# Patient Record
Sex: MALE
[De-identification: names, ages, dates, MRNs, and addresses within clinical notes are randomized per-mention and may not be internally consistent; named-entity substitution may affect disease eponyms.]

---

## 2017-11-28 ENCOUNTER — HOSPITAL ENCOUNTER (INPATIENT)
Dept: HOSPITAL 42 - ED | Age: 67
LOS: 3 days | Discharge: HOME | DRG: 841 | End: 2017-12-01
Attending: INTERNAL MEDICINE | Admitting: INTERNAL MEDICINE
Payer: COMMERCIAL

## 2017-11-28 VITALS — BODY MASS INDEX: 30.5 KG/M2

## 2017-11-28 DIAGNOSIS — C85.10: Primary | ICD-10-CM

## 2017-11-28 DIAGNOSIS — D69.6: ICD-10-CM

## 2017-11-28 DIAGNOSIS — D59.9: ICD-10-CM

## 2017-11-28 DIAGNOSIS — D72.819: ICD-10-CM

## 2017-11-28 DIAGNOSIS — I10: ICD-10-CM

## 2017-11-28 LAB
ALBUMIN/GLOB SERPL: 1.2 {RATIO} (ref 1.1–1.8)
ALP SERPL-CCNC: 56 U/L (ref 38–126)
ALT SERPL-CCNC: 33 U/L (ref 7–56)
ANISOCYTOSIS BLD QL SMEAR: (no result)
APPEARANCE UR: CLEAR
APTT BLD: 27.2 SECONDS (ref 25.1–36.5)
AST SERPL-CCNC: 42 U/L (ref 17–59)
BACTERIA #/AREA URNS HPF: (no result) /[HPF]
BASOPHILS # BLD AUTO: 0.07 K/MM3 (ref 0–2)
BASOPHILS NFR BLD: 2.9 % (ref 0–3)
BILIRUB DIRECT SERPL-MCNC: 0.8 MG/DL (ref 0–0.4)
BILIRUB SERPL-MCNC: 3.8 MG/DL (ref 0.2–1.3)
BILIRUB UR-MCNC: NEGATIVE MG/DL
BUN SERPL-MCNC: 19 MG/DL (ref 7–21)
CALCIUM SERPL-MCNC: 9.2 MG/DL (ref 8.4–10.5)
CHLORIDE SERPL-SCNC: 105 MMOL/L (ref 98–107)
CO2 SERPL-SCNC: 23 MMOL/L (ref 21–33)
COLOR UR: YELLOW
EOSINOPHIL # BLD: 0 10*3/UL (ref 0–0.7)
EOSINOPHIL NFR BLD: 1.3 % (ref 1.5–5)
EOSINOPHIL NFR BLD: 4 % (ref 0–3)
EPI CELLS #/AREA URNS HPF: (no result) /HPF (ref 0–5)
ERYTHROCYTE [DISTWIDTH] IN BLOOD BY AUTOMATED COUNT: 21.4 % (ref 11.5–14.5)
ERYTHROCYTE [DISTWIDTH] IN BLOOD BY AUTOMATED COUNT: 21.6 % (ref 11.5–14.5)
GLOBULIN SER-MCNC: 3.8 GM/DL
GLUCOSE SERPL-MCNC: 140 MG/DL (ref 70–110)
GLUCOSE UR STRIP-MCNC: NEGATIVE MG/DL
GRANULOCYTES # BLD: 1.08 10*3/UL (ref 1.4–6.5)
GRANULOCYTES NFR BLD: 45.2 % (ref 50–68)
HCT VFR BLD CALC: 16.6 % (ref 42–52)
HCT VFR BLD CALC: 18.4 % (ref 42–52)
HYPOCHROMIA BLD QL SMEAR: SLIGHT
INR PPP: 1.27 (ref 0.93–1.08)
IRON SERPL-MCNC: 211 UG/DL (ref 45–180)
KETONES UR STRIP-MCNC: NEGATIVE MG/DL
LEUKOCYTE ESTERASE UR-ACNC: NEGATIVE LEU/UL
LG PLATELETS BLD QL SMEAR: PRESENT
LIPASE SERPL-CCNC: 57 U/L (ref 23–300)
LYMPHOCYTES # BLD: 0.5 10*3/UL (ref 1.2–3.4)
LYMPHOCYTES NFR BLD AUTO: 22.6 % (ref 22–35)
MACROCYTES BLD QL SMEAR: (no result)
MAGNESIUM SERPL-MCNC: 2 MG/DL (ref 1.7–2.2)
MCH RBC QN AUTO: 35.5 PG (ref 25–35)
MCH RBC QN AUTO: 35.6 PG (ref 25–35)
MCHC RBC AUTO-ENTMCNC: 31.9 G/DL (ref 31–37)
MCHC RBC AUTO-ENTMCNC: 32.1 G/DL (ref 31–37)
MCV RBC AUTO: 110.8 FL (ref 80–105)
MCV RBC AUTO: 111.4 FL (ref 80–105)
METAMYELOCYTES NFR BLD: 3 %
MICROCYTES BLD QL SMEAR: (no result)
MONOCYTES # BLD AUTO: 0.7 10*3/UL (ref 0.1–0.6)
MONOCYTES NFR BLD: 28 % (ref 1–6)
NEUTROPHILS NFR BLD AUTO: 47 % (ref 50–70)
NEUTS BAND NFR BLD: 9 % (ref 0–2)
PH UR STRIP: 6 [PH] (ref 4.7–8)
PLATELET # BLD EST: NORMAL 10*3/UL
PLATELET # BLD: 140 10^3/UL (ref 120–450)
PLATELET # BLD: 150 10^3/UL (ref 120–450)
PMV BLD AUTO: 9.5 FL (ref 7–11)
PMV BLD AUTO: 9.6 FL (ref 7–11)
POIKILOCYTOSIS BLD QL SMEAR: SLIGHT
POLYCHROMASIA BLD QL SMEAR: (no result)
POTASSIUM SERPL-SCNC: 3.7 MMOL/L (ref 3.6–5)
PROT SERPL-MCNC: 8.5 G/DL (ref 5.8–8.3)
PROT UR STRIP-MCNC: NEGATIVE MG/DL
RBC # UR STRIP: (no result) /UL
RETICS/RBC NFR: 24.31 % (ref 0.5–1.5)
SODIUM SERPL-SCNC: 140 MMOL/L (ref 132–148)
SP GR UR STRIP: 1.01 (ref 1–1.03)
TROPONIN I SERPL-MCNC: < 0.01 NG/ML
UROBILINOGEN UR STRIP-ACNC: 1 E.U./DL
WBC # BLD AUTO: 2.3 10^3/UL (ref 4.5–11)
WBC # BLD AUTO: 2.4 10^3/UL (ref 4.5–11)
WBC #/AREA URNS HPF: (no result) /HPF (ref 0–6)

## 2017-11-28 RX ADMIN — PANTOPRAZOLE SODIUM SCH MG: 40 TABLET, DELAYED RELEASE ORAL at 18:46

## 2017-11-28 NOTE — CARD
--------------- APPROVED REPORT --------------





EKG Measurement

Heart [id number]

OR [id number]

[id number]

[id number]

[id number]



<Conclusion>

Normal sinus rhythm

Possible Inferior infarct, age undetermined

Abnormal ECG

## 2017-11-28 NOTE — RAD
HISTORY:

sob; MONCADA  



COMPARISON:

No prior.



TECHNIQUE:

Chest PA and lateral



FINDINGS:



LUNGS:

No active pulmonary disease.



PLEURA:

No significant pleural effusion identified. No pneumothorax apparent.



CARDIOVASCULAR:

Normal.



OSSEOUS STRUCTURES:

No significant abnormalities.



VISUALIZED UPPER ABDOMEN:

Normal.



OTHER FINDINGS:

None.



IMPRESSION:

No active disease.

## 2017-11-28 NOTE — ED PDOC
Arrival/HPI





- General


Chief Complaint: Abnormal Labs


Time Seen by Provider: 11/28/17 13:42


Historian: Patient, Family (son)





- History of Present Illness


Narrative History of Present Illness (Text): 


11/28/17 14:02








A 67 year old male, whose past medical history includes hypertension, presents 

to the emergency department after being sent in by Dr. Medina for "low blood 

count." The patient reports for the last few months he has been feeling weak 

and short of breath on exertion. He states for the last 4 days he has been 

feeling worse and nearly passed out 4 days ago.  He stated he felt light headed 

with hot flashes. Today the patient had the same symptoms. He notes when he is 

relaxed and sitting down he feels perfectly fine. The patient currently 

complains of shortness of breath of exertion and mild nausea; he denies any 

headaches, chest pain, abdominal pain, shortness of breath while resting, 

vomiting, diarrhea, hematuria, dysuria, or any other complaints at this time. 





PMD: Dr. Medina


Time/Duration: < week (worsening over the last 4 days), > month


Symptom Course: Worsening


Activities at Onset: Other (exertion )


Context: Walking, Exertion, Home





Past Medical History





- Provider Review


Nursing Documentation Reviewed: Yes





- Infectious Disease


Hx of Infectious Diseases: None





- Cardiac


Hx Hypertension: Yes





- Psychiatric


Hx Substance Use: No





- Anesthesia


Hx Anesthesia: No





Family/Social History





- Physician Review


Nursing Documentation Reviewed: Yes


Family/Social History: No Known Family HX


Smoking Status: Never Smoked


Hx Alcohol Use: No


Hx Substance Use: No





Allergies/Home Meds


Allergies/Adverse Reactions: 


Allergies





No Known Allergies Allergy (Verified 11/28/17 13:50)


 








Home Medications: 


 Home Meds











 Medication  Instructions  Recorded  Confirmed


 


Ergocalciferol [Drisdol 50,000 1 cap PO Q7D 11/28/17 11/28/17





Intl Units Cap]   


 


diltiaZEM CD [Cardizem CD] 1 tab PO DAILY 11/28/17 11/28/17














Review of Systems





- Physician Review


All systems were reviewed & negative as marked: Yes





- Review of Systems


Constitutional: Fatigue.  absent: Fevers


Respiratory: SOB


Cardiovascular: MONCADA, Other (near syncope).  absent: Chest Pain


Gastrointestinal: Nausea (mild).  absent: Abdominal Pain, Diarrhea, Vomiting


Genitourinary Male: absent: Dysuria, Hematuria


Neurological: Dizziness





Physical Exam


Vital Signs Reviewed: Yes


Vital Signs











  Temp Pulse Resp BP Pulse Ox


 


 11/28/17 15:03   86  18  132/69  99


 


 11/28/17 13:56  98.7 F  92 H  18  134/71  99











Temperature: Afebrile


Blood Pressure: Normal


Pulse: Regular


Respiratory Rate: Normal


Appearance: Positive for: Well-Appearing, Non-Toxic, Comfortable


Pain Distress: None


Mental Status: Positive for: Alert and Oriented X 3





- Systems Exam


Head: Present: Atraumatic, Normocephalic


Pupils: Present: PERRL


Conjunctiva: Present: Other (conjunctival pallor)


Mouth: Present: Moist Mucous Membranes, Other (oral mucosal pallor)


Pharnyx: Present: Normal.  No: ERYTHEMA, EXUDATE


Neck: Present: Normal Range of Motion


Respiratory/Chest: Present: Clear to Auscultation, Good Air Exchange.  No: 

Respiratory Distress, Accessory Muscle Use


Cardiovascular: Present: Regular Rate and Rhythm, Normal S1, S2.  No: Murmurs


Abdomen: Present: Normal Bowel Sounds.  No: Tenderness, Distention, Peritoneal 

Signs


Back: Present: Normal Inspection


Upper Extremity: Present: Normal Inspection.  No: Cyanosis, Edema


Lower Extremity: Present: Normal Inspection.  No: Edema


Neurological: Present: GCS=15, CN II-XII Intact, Speech Normal


Skin: Present: Warm, Dry, Normal Color.  No: Rashes


Psychiatric: Present: Alert, Oriented x 3, Normal Insight, Normal Concentration





Medical Decision Making


ED Course and Treatment: 


11/28/17 14:10


Impression: A 67 year old male with generalized weakness





Plan:


-- EKG


-- Chest X-ray


-- Labs


-- Urinalysis 


-- Reassess and disposition





Progress Notes:


 





11/28/17 15:13





Patient with Hgb of 5.9 with low WBC and normal platelet count.  Other labs are 

suggestive of hemolysis.  Patient consented for blood transfusion; consent 

signed.  Dr. Medina had already discussed the case with Dr. Johnson for consult.  

Will start transfusion and admit for further workup and treatment.  Case 

discussed with Dr. Willams for admission to his service.








- Lab Interpretations


Lab Results: 








 11/28/17 14:06 





 11/28/17 14:06 





 Lab Results





11/28/17 14:06: Blood Type Pending, Antibody Screen Pending, BBK History 

Checked No verified bt


11/28/17 14:06: Iron 211 H, TIBC 293.4, % Saturation 72 H


11/28/17 14:06: PT 13.9 H, INR 1.27 H, APTT 27.2


11/28/17 14:06: Sodium 140, Potassium 3.7, Chloride 105, Carbon Dioxide 23, 

Anion Gap 16, BUN 19, Creatinine 1.3, Est GFR (African Amer) > 60, Est GFR (Non-

Af Amer) 55, Random Glucose 140 H, Calcium 9.2, Magnesium 2.0, Total Bilirubin 

3.8 H, Direct Bilirubin 0.8 H, AST 42, ALT 33, Alkaline Phosphatase 56, Lactate 

Dehydrogenase 1553 H, Total Creatine Kinase 59, Troponin I < 0.01, NT-Pro-B 

Natriuret Pep 452 H, Total Protein 8.5 H, Albumin 4.6, Globulin 3.8, Albumin/

Globulin Ratio 1.2, Lipase 57, Vitamin B12 Pending, Folate Pending


11/28/17 14:06: WBC 2.4 L*, RBC 1.66 L, Hgb 5.9 L*, Hct 18.4 L*, .8 H, 

MCH 35.5 H, MCHC 32.1, RDW 21.4 H, Plt Count 150, MPV 9.6, Gran % 45.2 L, Lymph 

% (Auto) 22.6, Mono % (Auto) 28.0 H, Eos % (Auto) 1.3 L, Baso % (Auto) 2.9, 

Gran # 1.08 L, Lymph # 0.5 L, Mono # 0.7 H, Eos # 0.0, Baso # 0.07, Neutrophils 

% (Manual) Pending, Lymphocytes % (Manual) Pending, Monocytes % (Manual) Pending

, Retic Count 24.31 H*











- RAD Interpretation


Narrative RAD Interpretations (Text): 





11/28/17 15:18


no active disease.


Radiology Orders: 








11/28/17 13:58


CHEST TWO VIEWS (PA/LAT) [RAD] Stat 











: Radiologist





- EKG Interpretation


EKG Interpretation (Text): 





11/28/17 15:19


NSR @ 91; normal intervals; normal axis; less than 1/2 mm ST dep V4-V6.


Interpreted by ED Physician: Yes


Type: 12 lead EKG


Comparison: No previous EKG avail.





- Scribe Statement


The provider has reviewed the documentation as recorded by the Scribe


Jennifer Tyler





Provider Scribe Attestation:


All medical record entries made by the Scribe were at my direction and 

personally dictated by me. I have reviewed the chart and agree that the record 

accurately reflects my personal performance of the history, physical exam, 

medical decision making, and the department course for this patient. I have 

also personally directed, reviewed, and agree with the discharge instructions 

and disposition.








Disposition/Present on Arrival





- Present on Arrival


Any Indicators Present on Arrival: No


History of DVT/PE: No


History of Uncontrolled Diabetes: No


Urinary Catheter: No


History of Decub. Ulcer: No


History Surgical Site Infection Following: None





- Disposition


Have Diagnosis and Disposition been Completed?: Yes


Diagnosis: 


 Hemolytic anemia





Disposition: HOSPITALIZED


Disposition Time: 15:00


Patient Plan: Admission


Condition: FAIR


Referrals: 


Silas Medina MD [Primary Care Provider] - Follow up with primary


Forms:  REMOTV (English)

## 2017-11-29 LAB
ALBUMIN/GLOB SERPL: 1.3 {RATIO} (ref 1.1–1.8)
ALP SERPL-CCNC: 57 U/L (ref 38–126)
ALT SERPL-CCNC: 26 U/L (ref 7–56)
AST SERPL-CCNC: 37 U/L (ref 17–59)
BILIRUB SERPL-MCNC: 3.7 MG/DL (ref 0.2–1.3)
BUN SERPL-MCNC: 22 MG/DL (ref 7–21)
CALCIUM SERPL-MCNC: 9 MG/DL (ref 8.4–10.5)
CHLORIDE SERPL-SCNC: 107 MMOL/L (ref 98–107)
CO2 SERPL-SCNC: 21 MMOL/L (ref 21–33)
ERYTHROCYTE [DISTWIDTH] IN BLOOD BY AUTOMATED COUNT: 22.2 % (ref 11.5–14.5)
ERYTHROCYTE [DISTWIDTH] IN BLOOD BY AUTOMATED COUNT: 23.6 % (ref 11.5–14.5)
FOLATE SERPL-MCNC: 9.7 NG/ML
GLOBULIN SER-MCNC: 3.7 GM/DL
GLUCOSE SERPL-MCNC: 179 MG/DL (ref 70–110)
HCT VFR BLD CALC: 17.3 % (ref 42–52)
HCT VFR BLD CALC: 19.1 % (ref 42–52)
IGA SERPL-MCNC: 437.9 MG/DL (ref 70–400)
IGG SERPL-MCNC: 1714.6 MG/DL (ref 700–1600)
IGM SERPL-MCNC: 168.7 MG/DL (ref 40–230)
MCH RBC QN AUTO: 33.9 PG (ref 25–35)
MCH RBC QN AUTO: 35.3 PG (ref 25–35)
MCHC RBC AUTO-ENTMCNC: 31.8 G/DL (ref 31–37)
MCHC RBC AUTO-ENTMCNC: 32.5 G/DL (ref 31–37)
MCV RBC AUTO: 104.4 FL (ref 80–105)
MCV RBC AUTO: 110.9 FL (ref 80–105)
PLATELET # BLD: 175 10^3/UL (ref 120–450)
PLATELET # BLD: 175 10^3/UL (ref 120–450)
PMV BLD AUTO: 10 FL (ref 7–11)
PMV BLD AUTO: 10.1 FL (ref 7–11)
POTASSIUM SERPL-SCNC: 4.5 MMOL/L (ref 3.6–5)
PROT SERPL-MCNC: 8.3 G/DL (ref 5.8–8.3)
SODIUM SERPL-SCNC: 141 MMOL/L (ref 132–148)
WBC # BLD AUTO: 2.1 10^3/UL (ref 4.5–11)
WBC # BLD AUTO: 3.3 10^3/UL (ref 4.5–11)

## 2017-11-29 PROCEDURE — 30233N1 TRANSFUSION OF NONAUTOLOGOUS RED BLOOD CELLS INTO PERIPHERAL VEIN, PERCUTANEOUS APPROACH: ICD-10-PCS | Performed by: INTERNAL MEDICINE

## 2017-11-29 RX ADMIN — METHYLPREDNISOLONE SODIUM SUCCINATE SCH MLS/HR: 500 INJECTION, POWDER, FOR SOLUTION INTRAMUSCULAR; INTRAVENOUS at 17:20

## 2017-11-29 RX ADMIN — METHYLPREDNISOLONE SODIUM SUCCINATE SCH MLS/HR: 500 INJECTION, POWDER, FOR SOLUTION INTRAMUSCULAR; INTRAVENOUS at 10:13

## 2017-11-29 RX ADMIN — PANTOPRAZOLE SODIUM SCH MG: 40 TABLET, DELAYED RELEASE ORAL at 10:13

## 2017-11-29 RX ADMIN — DILTIAZEM HYDROCHLORIDE SCH MG: 300 CAPSULE, EXTENDED RELEASE ORAL at 10:13

## 2017-11-29 RX ADMIN — METHYLPREDNISOLONE SODIUM SUCCINATE SCH MLS/HR: 500 INJECTION, POWDER, FOR SOLUTION INTRAMUSCULAR; INTRAVENOUS at 03:19

## 2017-11-30 LAB
CMV IGG SERPL IA-ACNC: <0.6 U/ML
CMV IGM SERPL IA-ACNC: <30 AU/ML
ERYTHROCYTE [DISTWIDTH] IN BLOOD BY AUTOMATED COUNT: 24.7 % (ref 11.5–14.5)
ERYTHROCYTE [DISTWIDTH] IN BLOOD BY AUTOMATED COUNT: 24.8 % (ref 11.5–14.5)
HCT VFR BLD CALC: 20.6 % (ref 42–52)
HCT VFR BLD CALC: 22.3 % (ref 42–52)
MCH RBC QN AUTO: 33.8 PG (ref 25–35)
MCH RBC QN AUTO: 34.5 PG (ref 25–35)
MCHC RBC AUTO-ENTMCNC: 33.2 G/DL (ref 31–37)
MCHC RBC AUTO-ENTMCNC: 34 G/DL (ref 31–37)
MCV RBC AUTO: 101.5 FL (ref 80–105)
MCV RBC AUTO: 101.8 FL (ref 80–105)
PLATELET # BLD: 162 10^3/UL (ref 120–450)
PLATELET # BLD: 182 10^3/UL (ref 120–450)
PMV BLD AUTO: 10.4 FL (ref 7–11)
PMV BLD AUTO: 9.9 FL (ref 7–11)
T GONDII IGM SER-ACNC: <8 AU/ML
TOXOPLASMA IGG AB: 16.1 IU/ML
WBC # BLD AUTO: 3.9 10^3/UL (ref 4.5–11)
WBC # BLD AUTO: 4.5 10^3/UL (ref 4.5–11)

## 2017-11-30 PROCEDURE — 07DR3ZX EXTRACTION OF ILIAC BONE MARROW, PERCUTANEOUS APPROACH, DIAGNOSTIC: ICD-10-PCS | Performed by: INTERNAL MEDICINE

## 2017-11-30 RX ADMIN — PANTOPRAZOLE SODIUM SCH MG: 40 TABLET, DELAYED RELEASE ORAL at 09:31

## 2017-11-30 RX ADMIN — DILTIAZEM HYDROCHLORIDE SCH MG: 300 CAPSULE, EXTENDED RELEASE ORAL at 09:30

## 2017-11-30 RX ADMIN — METHYLPREDNISOLONE SODIUM SUCCINATE SCH MLS/HR: 500 INJECTION, POWDER, FOR SOLUTION INTRAMUSCULAR; INTRAVENOUS at 17:49

## 2017-11-30 RX ADMIN — METHYLPREDNISOLONE SODIUM SUCCINATE SCH MLS/HR: 500 INJECTION, POWDER, FOR SOLUTION INTRAMUSCULAR; INTRAVENOUS at 02:11

## 2017-11-30 RX ADMIN — METHYLPREDNISOLONE SODIUM SUCCINATE SCH MLS/HR: 500 INJECTION, POWDER, FOR SOLUTION INTRAMUSCULAR; INTRAVENOUS at 09:30

## 2017-11-30 NOTE — HP
HISTORY OF PRESENT ILLNESS:  The patient is a 67-year-old, patient of Dr. Medina.  States he has not been feeling well since October.  He feels very

weak, tired, and get short of breath very easily.  He thought he is under

the weather, did not pay much attention, but lately, his fatigue got worse,

so he went to see Dr. Medina,who did basic blood work and was found to have

low hemoglobin, so he got a call yesterday to go ahead to emergency room

for further evaluation.  The patient states he never had this issue before.

He recently had colonoscopy done by Dr. Mason and was told colonoscopy

is okay.  At that point, his blood work was fine.



PAST MEDICAL HISTORY:  Significant for hypertension only and he has

leukoderma of both hands and for that he is getting some local topical

creams by a local dermatologist.



ALLERGIES:  HE IS NOT ALLERGIC TO ANY MEDICATION.



MEDICATIONS AT HOME:  He is on diltiazem 300 mg daily and vitamin D 50,000

q. weekly.



SOCIAL HISTORY:  He denies smoking, drinking, or alcohol use.



REVIEW OF SYSTEMS:  Significant for fatigue and getting short of breath on

doing minimal chores.



PHYSICAL EXAMINATION:

GENERAL:  He is awake, alert, oriented, and communicative.

VITAL SIGNS:  He is afebrile, pulse 86, respirations 18, and blood pressure

145/84.

LUNGS:  Bilateral fair airflow.  No rhonchi or crackles.

HEART:  S1 and S2 audible.

ABDOMEN:  Soft and nontender.  No rebound.  No guarding.

NEUROLOGIC:  The patient is awake, alert, oriented, and communicative. 

Moves all extremities.



LABORATORY DATA:  WBC is 2.4, hemoglobin is 5.9, hematocrit is 18.4, and

platelets are 150.  PT 13.9 and INR 1.27.  Chemistry:  Sodium 140,

potassium 3.7, chloride 105, CO2 of 23, BUN 19, and creatinine 1.3.  Blood

sugar of 140.  LDH is 1553.  BNP is 452.  Total bilirubin is 3.7.  Urine

negative for leukocyte.  Hepatitis profile is negative.  X-ray of chest is

unremarkable.  EKG, possible inferior infarct, age is undetermined.



ASSESSMENT:

1.  Symptomatic anemia probably hemolytic, his LDH and total bilirubin is

high.

2.  Leukopenia.

3.  History of hypertension.

4.  Exertional dyspnea.



PLAN:  We will start the patient on diltiazem.  He is on Protonix.  He has

been started on methylprednisolone 100 mg q.8 hours and he will receive two

blood transfusions today and he is plan to have bone marrow done tomorrow.







__________________________________________

Celeste Cueva MD





DD:  11/29/2017 15:45:08

DT:  11/29/2017 19:29:25

Job # [id number]

## 2017-11-30 NOTE — CON
DATE:  11/29/2017



HEMATOLOGY CONSULTATION



HISTORY OF PRESENT ILLNESS:  This is a 67-year-old man with severe anemia,

cigarettes negative, alcohol negative, meth negative.  I saw the patient in

July 2017 for a platelet count thrombocytopenia and found him to have a

clumping of the platelets and no further evaluation.  He states that in

09/27/2017, he had a colonoscopy, which was negative, but after the

colonoscopy, sometime in mid October, he started feeling run down.  He

started feeling more shortness of breath when he walk.  This progressed

until finally he started developing orthostatic changes and he came to the

hospital, where he was found to have a hemoglobin of 5.3.



PHYSICAL EXAMINATION:

SKIN:  No petechiae.  No bruises.

HEENT:  Shows some mild icterus, but no mucosal bleeding.

NODES:  Nonpalpable in axillary, cervical, supraclavicular or inguinal

regions.

LUNGS:  Clear at present.  No vertebral tenderness.

HEART:  S1 and S2.

ABDOMEN:  Shows no liver, no spleen.  No tenderness, no rebound, no

ascites.

EXTREMITIES:  No edema.

CENTRAL NERVOUS SYSTEM:  No focal findings.



LABORATORY DATA:  Blood count showed a white count of 2.3 with a hemoglobin

of 5.3, hematocrit 16.6, MCV of 111, MCH 35.6, platelet count 140. 

Furthermore, his white count, the granulocytes were 45%, lymphocytes 22 and

monocytes 28%.  His peripheral smear shows polychromasia, increased retics,

his retic count is 24%, macrocytosis; no abnormal white counts in terms of

smudge cells or blasts.  The BUN was 22 and sugars have been about 140 to

180.  Lying down, he feels fine; it is when he walks, he gets short of

breath.



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  I ordered steroids,

100 mg Solu-Medrol q.8 hours and he is receiving that.  This morning, his

hemoglobin may have been somewhat of an improvement in that the hemoglobin

went from 5.3 to 5.5, more importantly his RBC number of 1.49 to 1.56.  I

will see how he does today will have to start with Solu-Medrol.  I will see

him tomorrow.  He knows that I am going to do a bone marrow aspiration

biopsy hopefully tomorrow at about 1:00 and he knows to _____ as well.  I

have ordered hepatitis profile and _____ in preparation for possibly giving

gamma globulin and _____ as a possibility, but we want to rule out a

lymphoma or chronic leukocytic leukemia here.  So for now, he is in bed, he

is with oxygen, he has stable vital signs in bed, and we have just started

him on steroid treatment while we are waiting for reports to help us to

determine the underlying condition.





__________________________________________

Tanner Johnson MD



DD:  11/29/2017 8:16:21

DT:  11/29/2017 10:50:37

Job # [id number]

## 2017-11-30 NOTE — PN
DATE:  11/30/2017



Followup consultation of patient, Gavin Velez, with an acute hemolytic

anemia.  His hemoglobin was 5.3 on presentation.  We started him on

steroids and that went up to 5.5 and later on in the afternoon yesterday,

came up to 6.2 prior to his transfusion.  They were ultimately able to get

compatible blood for him and I ordered that he get 2 units of packed cells

over the night.  We will see what his CBC is for this morning.  His MCV

also before the transfusion went from 111 down to 104 on the steroids.  The

IgG was 1700, not very high; the IgM 437, again not very high.  Hepatitis

screen is negative.  B12 is normal.  Folate is normal.  So, at this point,

later this afternoon, I will do a bone marrow aspiration and biopsy and

possibly speak with members of family.  We will also have the CBC report,

but if the hemoglobin is going up and he seems to be feeling better, we may

be able to switch him to prednisone 40 mg a day and see him as an

outpatient.  We will see what the bone marrow shows today, what his blood

counts are showing.  But, if they are improving, we will be able to switch

him to pills.





__________________________________________

Tanner Johnson MD



DD:  11/30/2017 7:35:25

DT:  11/30/2017 8:27:15

Job # [id number]

## 2017-12-01 VITALS
RESPIRATION RATE: 16 BRPM | DIASTOLIC BLOOD PRESSURE: 91 MMHG | HEART RATE: 67 BPM | TEMPERATURE: 98 F | SYSTOLIC BLOOD PRESSURE: 143 MMHG

## 2017-12-01 VITALS — OXYGEN SATURATION: 99 %

## 2017-12-01 LAB
ALBUMIN/GLOB SERPL: 1.2 {RATIO} (ref 1.1–1.8)
ALP SERPL-CCNC: 44 U/L (ref 38–126)
ALT SERPL-CCNC: 30 U/L (ref 7–56)
AST SERPL-CCNC: 42 U/L (ref 17–59)
BILIRUB SERPL-MCNC: 3.2 MG/DL (ref 0.2–1.3)
BUN SERPL-MCNC: 30 MG/DL (ref 7–21)
CALCIUM SERPL-MCNC: 8.2 MG/DL (ref 8.4–10.5)
CHLORIDE SERPL-SCNC: 105 MMOL/L (ref 98–107)
CO2 SERPL-SCNC: 22 MMOL/L (ref 21–33)
EBV VCA IGM SER-ACNC: <36 U/ML
ERYTHROCYTE [DISTWIDTH] IN BLOOD BY AUTOMATED COUNT: 24.5 % (ref 11.5–14.5)
GLOBULIN SER-MCNC: 3.2 GM/DL
GLUCOSE SERPL-MCNC: 203 MG/DL (ref 70–110)
HCT VFR BLD CALC: 21.3 % (ref 42–52)
MCH RBC QN AUTO: 33.3 PG (ref 25–35)
MCHC RBC AUTO-ENTMCNC: 32.4 G/DL (ref 31–37)
MCV RBC AUTO: 102.9 FL (ref 80–105)
PLATELET # BLD: 162 10^3/UL (ref 120–450)
PMV BLD AUTO: 10.3 FL (ref 7–11)
POTASSIUM SERPL-SCNC: 4.5 MMOL/L (ref 3.6–5)
PROT SERPL-MCNC: 7.2 G/DL (ref 5.8–8.3)
SODIUM SERPL-SCNC: 137 MMOL/L (ref 132–148)
WBC # BLD AUTO: 3.8 10^3/UL (ref 4.5–11)

## 2017-12-01 RX ADMIN — METHYLPREDNISOLONE SODIUM SUCCINATE SCH MLS/HR: 500 INJECTION, POWDER, FOR SOLUTION INTRAMUSCULAR; INTRAVENOUS at 00:59

## 2017-12-01 RX ADMIN — PANTOPRAZOLE SODIUM SCH MG: 40 TABLET, DELAYED RELEASE ORAL at 09:55

## 2017-12-01 RX ADMIN — DILTIAZEM HYDROCHLORIDE SCH MG: 300 CAPSULE, EXTENDED RELEASE ORAL at 09:54

## 2017-12-01 RX ADMIN — METHYLPREDNISOLONE SODIUM SUCCINATE SCH MLS/HR: 500 INJECTION, POWDER, FOR SOLUTION INTRAMUSCULAR; INTRAVENOUS at 11:01

## 2017-12-01 NOTE — PN
DATE:  12/01/2017



This is a late entry for 11/30/2017.  I had technical problems with getting

into [employer] by reaching the patient.



SUBJECTIVE:  The patient has no complaints of any chest pain.  No shortness

of breath.  No headache.  No dizziness.



PHYSICAL EXAMINATION:

VITAL SIGNS:  Temperature is 98.5, pulse of 86, blood pressure is 129/72,

and respirations are 20.

GENERAL:  The patient is lying in bed, flat, comfortable.

HEENT:  No oral lesion.  Anicteric sclerae.  Moist mucosa.

NECK:  No JVD, adenopathy, or thyromegaly.

CARDIOVASCULAR:  S1 and S2, regular.  No murmurs, rubs, or gallops.

LUNGS:  Clear to auscultation bilaterally.  No wheeze, rales, or rhonchi.

ABDOMEN:  Bowel sounds are positive, soft, nontender and nondistended.

EXTREMITIES:  No cyanosis, clubbing or edema.



ASSESSMENT:

1.  Acute anemia secondary to hemolysis.

2.  Thrombocytopenia.

3.  Hypertension.



PLAN:  The patient is going to get bone marrow done by Dr. Johnson.  He did

get transfusion of packed red blood cells.  He has full serological workup

that was ordered by Dr. Johnson.  He is currently comfortable.  I did speak

to the patient's son at the bedside to give him an update on the patient's

diagnosis and plan of care.  If he feels well tomorrow and does not have

any issues of bleeding, the patient will most likely be discharged home.





__________________________________________

Dimitri Willams MD





DD:  12/01/2017 19:40:14

DT:  12/01/2017 20:10:41

Job # [id number]

## 2017-12-01 NOTE — CON
FOLLOWUP ONCOLOGY CONSULTATION



DATE:



1.  The patient is status post bone marrow aspiration biopsy from the right

posterior iliac crest, which was performed yesterday afternoon, under

sterile conditions with 1% lidocaine for pain and no excessive bleeding. 

No complication.  We will see what the results are over the next couple of

days, next week.

2.  The patient is status post 2 units of packed cells, we have to get that

for him, and he is on the steroids.  His RBC numbers gone up from 1.83 to

2.07.  His hemoglobin 6.2 up to 6.9 and his hematocrit 19 to 21 with his

MCV going down to 104 to 103.  Symptomatically, he is markedly improved. 

He says he has much less shortness of breath and he has much more energy. 

This may be while still on the steroids, but his hemoglobin is coming up. 

At this point, the labs will come back next week and I gave him a

prescription today for prednisone 40 mg p.o. twice a day with the Prilosec

40 mg p.o. once a day.  I will see him in the office on Monday to recheck

the CBC and to reevaluate other medications including gamma globulin as an

outpatient, but for now the patient is symptomatically improved.  I told to

him and the son that he cannot go back to work that he has to stay in his

home over the weekend till he sees me on Monday, where I can repeat CBC,

but there seems to be _____ improvement here.





__________________________________________

Tanner Johnson MD



DD:  12/01/2017 7:13:24

DT:  12/01/2017 8:03:17

Job # [id number]

## 2017-12-02 NOTE — DS
HISTORY OF PRESENT ILLNESS:  This is a 67-year-old male who is coming into

the hospital because of severe anemia as well as thrombocytopenia.  The

patient had difficulty to match blood and eventually the patient was

matched and given a transfusion.  He was seen by Dr. Johnson from Hematology.

He had a bone marrow done yesterday and he is going to be discharged home

today.  The patient has acute hemolytic anemia and had a hemoglobin of 5.3

on initial presentation.  He is comfortable.  He has no complaints of any

chest pain or shortness of breath.  He is going to be discharged home today

and follow up as an outpatient.



PHYSICAL EXAMINATION:

VITAL SIGNS:  Temperature is 98, pulse is 67, blood pressure is 143/91, and

respirations are 16.

GENERAL:  The patient is lying in bed, flat, comfortable.

HEENT:  No oral lesion.  Anicteric sclerae.  Moist mucosa.

NECK:  No JVD, adenopathy, or thyromegaly.

CARDIOVASCULAR:  S1 and S2, regular.  No murmurs, rubs, or gallops.

LUNGS:  Clear to auscultation bilaterally.  No wheeze, rales, or rhonchi.

ABDOMEN:  Bowel sounds are positive, soft, nontender and nondistended.

EXTREMITIES:  No cyanosis, clubbing or edema.



ASSESSMENT:

1.  Acute anemia secondary to acute hemolysis.

2.  Thrombocytopenia.

3.  Hypertension.

4.  Leukopenia.

5.  Shortness of breath secondary to anemia.



PLAN:  The patient has a hemoglobin of 6.9 and he was okay to be

discharged.  He had hepatitis B and C that was negative.  His Connie-Patino

IgM is low as well as CMP is low.  He had chemistry that showed creatinine

that was 1.1.  He is going to be discharged home today to follow as an

outpatient.



CONDITION:  Stable.



ACTIVITIES:  Increase as tolerated.





__________________________________________

Dimitri Willams MD





DD:  12/01/2017 19:38:41

DT:  12/01/2017 23:42:41

Job # [id number]

## 2017-12-04 LAB — EBV EA (D) AB IGG: 13.3 U/ML (ref ?–9)

## 2017-12-04 NOTE — PQF GENQUE
***** This form is a permanent part of the medical record*****

Dr. Johnson,



          

Clarification of your documentation is requested to better reflect the severity 
of illness and intensity of treatment of your patient.  



Indicators present: Please see the result of the external pathology report in 
the EMR. Please indicate below if you agree with    "B-CELL LYMPHOMA". Thank 
you.



[]  Specify: []

         



[]  Specify: []         

 



[]  Specify: []         





[]  Specify: []         





Location in the medical record that reflects the above clinical findings:  [] 
external pathology report (EMR)

 



Treatment Provided:  [] 

  

PHYSICIAN'S RESPONSE





Based on your medical judgment of the clinical indicators outlined above please 
clarify the following:



[]  Practitioner response 



[]  If unable to determine, please check the box, sign and date.  





Present On Admission (POA) Indicator:





[] Present at the time of admission 



[] Not present at the time of admission



[] Clinically Undetermined









In responding to this query, please exercise your independent professional 
judgment.  The fact that a question is asked does not imply that any particular 
answer is desired or expected.  Thank you for your clarification on this 
documentation.



If you have any questions please call:[ ]

* Thank you,

   [ ]evan [facility] Coder
[facility]

## 2017-12-06 NOTE — PQF GENQUE
***** This form is a permanent part of the medical record*****

Dr. Willams,

Dr. Johnson did a bone marrow bx  and we just got the report back says "B-cell 
lymphoma" via external pathology report. If you agree with result please 
document below the physician's response. Would you say is this POA? Thank you.





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

          

Clarification of your documentation is requested to better reflect the severity 
of illness and intensity of treatment of your patient.  



Indicators present      



[]  Specify: []

         



[]  Specify: []         

 



[]  Specify: []         





[]  Specify: []         





Location in the medical record that reflects the above clinical findings:  []

 



Treatment Provided:  []

  

PHYSICIAN'S RESPONSE





Based on your medical judgment of the clinical indicators outlined above please 
clarify the following:



[]  Practitioner response 



[]  If unable to determine, please check the box, sign and date.  





Present On Admission (POA) Indicator:





[x] Present at the time of admission 



[] Not present at the time of admission



[] Clinically Undetermined









In responding to this query, please exercise your independent professional 
judgment.  The fact that a question is asked does not imply that any particular 
answer is desired or expected.  Thank you for your clarification on this 
documentation.



If you have any questions please call:[ ]

* Thank you,

   [ ]CRICKET [facility] Coder
[facility]

## 2018-09-18 ENCOUNTER — HOSPITAL ENCOUNTER (EMERGENCY)
Dept: HOSPITAL 42 - ED | Age: 68
Discharge: HOME | End: 2018-09-18
Payer: COMMERCIAL

## 2018-09-18 VITALS — OXYGEN SATURATION: 99 % | SYSTOLIC BLOOD PRESSURE: 156 MMHG | DIASTOLIC BLOOD PRESSURE: 79 MMHG | HEART RATE: 78 BPM

## 2018-09-18 VITALS — RESPIRATION RATE: 18 BRPM | TEMPERATURE: 98.6 F

## 2018-09-18 VITALS — BODY MASS INDEX: 30.5 KG/M2

## 2018-09-18 DIAGNOSIS — L50.9: Primary | ICD-10-CM

## 2018-09-18 NOTE — ED PDOC
Arrival/HPI





- General


Historian: Patient





- History of Present Illness


Time/Duration: 4-6 hours


Symptom Onset: Sudden


Symptom Course: Worsening


Quality: Other (no pain)





- General


Chief Complaint: Abnormal Skin Integrity


Time Seen by Provider: 09/18/18 17:59





- History of Present Illness


Narrative History of Present Illness (Text): 





09/18/18 18:02


Patient is a 68 year old male with a past medical history of hypertension 

presents to the emergency room with a complaint of a rash. He first noticed a 

rash on his arms yesterday in the afternoon but it soon disappeared without him 

doing anything. He noticed this afternoon that the rash started to return, but 

this time was much more diffuse. The rash was located on his arms, chest, back 

and feet, but not on his face, legs or genital region. The rash was raised of 

the skin but was not any different color from his skin, no redness. There is no 

pain, no itch and no burning associated with the rash. He decided to rub 

alcohol on the rash this afternoon in an attempt to kill any bacteria. He 

decided to stop after noticing his skin starting turning red. He has no other 

complaints, denies fevers, chills, nausea, vomiting, diarrhea, constipation, 

chest pain, shortness of breath, wheezing, difficulty swallowing, vision changes

, numbness or tingling.





Seasonal allergies only





 (EduardoLauri)





Past Medical History





- Provider Review


Nursing Documentation Reviewed: Yes





- Infectious Disease


Hx of Infectious Diseases: None





- Cardiac


Hx Hypertension: Yes





- HEENT


Hx HEENT Disorder: Yes (eyeglasses)





- Musculoskeletal/Rheumatological


Hx Falls: No





- Psychiatric


Hx Substance Use: No





- Anesthesia


Hx Anesthesia: No





Family/Social History





- Physician Review


Nursing Documentation Reviewed: Yes


Family/Social History: Unknown Family HX


Smoking Status: Never Smoked


Hx Alcohol Use: No


Hx Substance Use: No





Allergies/Home Meds


Allergies/Adverse Reactions: 


Allergies





No Known Allergies Allergy (Verified 09/18/18 17:40)


 








Home Medications: 


 Home Meds











 Medication  Instructions  Recorded  Confirmed


 


diltiaZEM CD [Cardizem CD] 300 mg PO DAILY 11/28/17 09/18/18














Review of Systems





- Physician Review


All systems were reviewed & negative as marked: Yes





- Review of Systems


Constitutional: Normal.  absent: Fatigue, Fevers


Eyes: Normal.  absent: Vision Changes


ENT: Normal.  absent: Sore Throat, Rhinorrhea


Respiratory: Normal.  absent: SOB, Cough, Wheezing


Cardiovascular: Normal.  absent: Chest Pain, Palpitations, Syncope


Gastrointestinal: Normal.  absent: Abdominal Pain, Constipation, Diarrhea, 

Nausea, Vomiting


Musculoskeletal: Normal.  absent: Back Pain, Neck Pain


Skin: Rash.  absent: Pruritis, Abscess, Ulcer, Cellulitis


Neurological: Normal.  absent: Headache, Dizziness


Endocrine: Normal.  absent: Diaphoresis


Hemo/Lymphatic: Normal


Psychiatric: Normal.  absent: Anxiety





Physical Exam


Vital Signs Reviewed: Yes


Temperature: Afebrile


Blood Pressure: Hypertensive


Pulse: Regular


Respiratory Rate: Normal


Appearance: Positive for: Well-Appearing, Non-Toxic, Comfortable


Pain Distress: None


Mental Status: Positive for: Alert and Oriented X 3





- Systems Exam


Head: Present: Atraumatic, Normocephalic


Extroacular Muscles: Present: EOMI


Conjunctiva: Present: Normal


Mouth: Present: Moist Mucous Membranes


Nose (External): Present: Atraumatic


Nose (Internal): Present: No Active Bleeding


Neck: Present: Normal Range of Motion


Respiratory/Chest: Present: Clear to Auscultation, Good Air Exchange.  No: 

Respiratory Distress, Accessory Muscle Use, Wheezes, Rales, Retracting, Rhonchi

, Tachypneic


Cardiovascular: Present: Regular Rate and Rhythm, Normal S1, S2.  No: Murmurs, 

Tachycardic


Abdomen: No: Tenderness, Distention, Peritoneal Signs


Back: Present: Other (see skin exam)


Upper Extremity: Present: Normal ROM, NORMAL PULSES, Other (see skin exam).  No

: Cyanosis, Edema, Tenderness, Swelling


Lower Extremity: Present: NORMAL PULSES, Other (see skin exam).  No: Edema, 

CALF TENDERNESS, Tenderness, Swelling


Neurological: Present: GCS=15, Speech Normal, Motor Func Grossly Intact


Skin: Present: Warm, Dry, Rashes (Urticaria - on chest wrapping around to back, 

on arms b/l and tops of feet b/l. No surrounding cellulitis, mild erythema (

most likely 2/2 to patient rubbing alcohol on hives), non-tender).  No: 

Diaphoretic


Psychiatric: Present: Alert, Oriented x 3, Normal Insight, Normal Concentration





Vital Signs











  Temp Pulse Resp BP Pulse Ox


 


 09/18/18 17:37  98.6 F  62  18  180/90 H  96














Medical Decision Making


ED Course and Treatment: 





09/18/18 18:19


Patient is a 68 year old male with past medical history including hypertension 

presenting with urticria on chest, back, arms b/l and feet b/l.


Patient's physical exam is otherwise unremarkable, no wheezing, strider or 

shortness of breath.


Benadryl 50mg PO given


Will re-assess





09/18/18 18:23


Patient is requesting to be discharged home. 


Discussed with patient he can be discharged home but he is to return to the 

emergency room immediately if he starts to notice new or worsening symptoms, 

including but not limited to wheezing, strider, shortness of breath, chest pain 

or worsening rash.


This was told to both patient and his son at bedside. Both state they 

understand and agree. 


Will discharge home with a prescription for Benadryl. (Eduardo,aLuri)








09/18/18 18:23


67 yo male presents with uticarial rash. Agree with resident history and 

physical assessment and plan. Patient does not have cp or sob. No throat 

closing sensation. He did not try any meds for rash at home. He thinks maybe it 

was tomatoes that caused this but he's not sure. He will check to see if he's 

used any new soaps or detergents. 





Rash is scattered uticaria on back chest, arms and feet. Treated with bendryl. 

Patient requested to go home and he will monitor rash and return to the ED with 

any worsening symptoms or concerns.  (Ford Greco)





- Medication Orders


Current Medication Orders: 














Discontinued Medications





Diphenhydramine HCl (Benadryl)  50 mg PO STAT STA


   Stop: 09/18/18 18:00


   Last Admin: 09/18/18 18:16  Dose: 50 mg











Disposition/Present on Arrival





- Present on Arrival


Any Indicators Present on Arrival: No


History of DVT/PE: No


History of Uncontrolled Diabetes: No


Urinary Catheter: No


History of Decub. Ulcer: No


History Surgical Site Infection Following: None





- Disposition


Have Diagnosis and Disposition been Completed?: Yes


Disposition Time: 18:26


Patient Plan: Discharge





- Disposition


Diagnosis: 


 Urticaria





Disposition: HOME/ ROUTINE


Condition: GOOD


Discharge Instructions (ExitCare):  Hives (JOHNNY)


Additional Instructions: 





DEANDRA WOOD, thank you for letting us take care of you today. Your provider 

was Ford Greco DO and you were treated for an allergic reaction rash. 

The emergency medical care you received today was directed at your acute 

symptoms. If you were prescribed any medication, please fill it and take as 

directed. It may take several days for your symptoms to resolve. Patient is to 

return to the emergency room immediately if he starts to notice new or 

worsening symptoms, including but not limited to wheezing, strider, shortness 

of breath, chest pain or worsening rash.





Please contact your doctor or call one of the physicians/clinics you have been 

referred to that are listed on the Patient Visit Information form that is 

included in your discharge packet. Bring any paperwork you were given at 

discharge with you along with any medications you are taking to your follow up 

visit. Our treatment cannot replace ongoing medical care by a primary care 

provider outside of the emergency department.





Thank you for allowing the [facility] team to be part of your care today.








If you had an X-Ray or CT scan: A Radiologist will review the ED reading if any 

change in treatment is needed we will contact you.***





If you had a blood, urine, or wound culture: It will take several days for the 

results, if any change in treatment is needed we will contact you.***





If you had an STI test: It will take 48 hours for the results. Please call 

after 1 week if you have not heard back.***


Prescriptions: 


DiphenhydrAMINE [Benadryl] 50 mg PO Q6H PRN #20 cap


 PRN Reason: Rash


Forms:  [email or web address] (English)

## 2018-10-12 ENCOUNTER — HOSPITAL ENCOUNTER (INPATIENT)
Dept: HOSPITAL 42 - ED | Age: 68
LOS: 5 days | Discharge: HOME | DRG: 813 | End: 2018-10-17
Attending: INTERNAL MEDICINE | Admitting: INTERNAL MEDICINE
Payer: COMMERCIAL

## 2018-10-12 VITALS — BODY MASS INDEX: 28.9 KG/M2

## 2018-10-12 DIAGNOSIS — C91.10: ICD-10-CM

## 2018-10-12 DIAGNOSIS — D59.4: ICD-10-CM

## 2018-10-12 DIAGNOSIS — K06.8: ICD-10-CM

## 2018-10-12 DIAGNOSIS — E55.9: ICD-10-CM

## 2018-10-12 DIAGNOSIS — D69.3: Primary | ICD-10-CM

## 2018-10-12 DIAGNOSIS — D69.41: ICD-10-CM

## 2018-10-12 DIAGNOSIS — I10: ICD-10-CM

## 2018-10-12 LAB
ALBUMIN SERPL-MCNC: 4.3 G/DL (ref 3–4.8)
ALBUMIN/GLOB SERPL: 1.2 {RATIO} (ref 1.1–1.8)
ALT SERPL-CCNC: 20 U/L (ref 7–56)
APTT BLD: 30.1 SECONDS (ref 25.1–36.5)
AST SERPL-CCNC: 18 U/L (ref 17–59)
BASOPHILS # BLD AUTO: 0 K/MM3 (ref 0–2)
BASOPHILS # BLD AUTO: 0 K/MM3 (ref 0–2)
BASOPHILS # BLD AUTO: 0.05 K/MM3 (ref 0–2)
BASOPHILS NFR BLD: 0 % (ref 0–3)
BASOPHILS NFR BLD: 0 % (ref 0–3)
BASOPHILS NFR BLD: 2.1 % (ref 0–3)
BUN SERPL-MCNC: 16 MG/DL (ref 7–21)
CALCIUM SERPL-MCNC: 8.8 MG/DL (ref 8.4–10.5)
EOSINOPHIL # BLD: 0 10*3/UL (ref 0–0.7)
EOSINOPHIL NFR BLD: 0 % (ref 1.5–5)
EOSINOPHIL NFR BLD: 0 % (ref 1.5–5)
EOSINOPHIL NFR BLD: 1.7 % (ref 1.5–5)
ERYTHROCYTE [DISTWIDTH] IN BLOOD BY AUTOMATED COUNT: 18.3 % (ref 11.5–14.5)
ERYTHROCYTE [DISTWIDTH] IN BLOOD BY AUTOMATED COUNT: 18.4 % (ref 11.5–14.5)
ERYTHROCYTE [DISTWIDTH] IN BLOOD BY AUTOMATED COUNT: 18.7 % (ref 11.5–14.5)
GFR NON-AFRICAN AMERICAN: 50
GRANULOCYTES # BLD: 1.42 10*3/UL (ref 1.4–6.5)
GRANULOCYTES # BLD: 1.52 10*3/UL (ref 1.4–6.5)
GRANULOCYTES # BLD: 1.54 10*3/UL (ref 1.4–6.5)
GRANULOCYTES NFR BLD: 59.6 % (ref 50–68)
GRANULOCYTES NFR BLD: 78.8 % (ref 50–68)
GRANULOCYTES NFR BLD: 83.2 % (ref 50–68)
HGB BLD-MCNC: 8.5 G/DL (ref 14–18)
HGB BLD-MCNC: 8.7 G/DL (ref 14–18)
HGB BLD-MCNC: 9.2 G/DL (ref 14–18)
INR PPP: 1.12
LYMPHOCYTES # BLD: 0.3 10*3/UL (ref 1.2–3.4)
LYMPHOCYTES # BLD: 0.3 10*3/UL (ref 1.2–3.4)
LYMPHOCYTES # BLD: 0.4 10*3/UL (ref 1.2–3.4)
LYMPHOCYTES NFR BLD AUTO: 14.6 % (ref 22–35)
LYMPHOCYTES NFR BLD AUTO: 17.1 % (ref 22–35)
LYMPHOCYTES NFR BLD AUTO: 18.5 % (ref 22–35)
MCH RBC QN AUTO: 29.8 PG (ref 25–35)
MCH RBC QN AUTO: 29.9 PG (ref 25–35)
MCH RBC QN AUTO: 30.1 PG (ref 25–35)
MCHC RBC AUTO-ENTMCNC: 31.6 G/DL (ref 31–37)
MCHC RBC AUTO-ENTMCNC: 31.8 G/DL (ref 31–37)
MCHC RBC AUTO-ENTMCNC: 31.8 G/DL (ref 31–37)
MCV RBC AUTO: 93.5 FL (ref 80–105)
MCV RBC AUTO: 94.5 FL (ref 80–105)
MCV RBC AUTO: 94.7 FL (ref 80–105)
MONOCYTES # BLD AUTO: 0 10*3/UL (ref 0.1–0.6)
MONOCYTES # BLD AUTO: 0.1 10*3/UL (ref 0.1–0.6)
MONOCYTES # BLD AUTO: 0.4 10*3/UL (ref 0.1–0.6)
MONOCYTES NFR BLD: 18.1 % (ref 1–6)
MONOCYTES NFR BLD: 2.2 % (ref 1–6)
MONOCYTES NFR BLD: 4.1 % (ref 1–6)
PLATELET # BLD EST: 2 10*3/UL
PLATELET # BLD EST: 8 10*3/UL
PLATELET # BLD: 1 10^3/UL (ref 120–450)
PLATELET # BLD: 1 10^3/UL (ref 120–450)
PLATELET # BLD: 7 10^3/UL (ref 120–450)
PLATELET COUNT MANUAL: 2 K/MM3 (ref 120–450)
PROTHROMBIN TIME: 12.9 SECONDS (ref 9.4–12.5)
RBC # BLD AUTO: 2.82 10^6/UL (ref 3.5–6.1)
RBC # BLD AUTO: 2.91 10^6/UL (ref 3.5–6.1)
RBC # BLD AUTO: 3.09 10^6/UL (ref 3.5–6.1)
WBC # BLD AUTO: 1.9 10^3/UL (ref 4.5–11)
WBC # BLD AUTO: 1.9 10^3/UL (ref 4.5–11)
WBC # BLD AUTO: 2.4 10^3/UL (ref 4.5–11)

## 2018-10-12 PROCEDURE — 3E033WL INTRODUCTION OF IMMUNOSUPPRESSIVE INTO PERIPHERAL VEIN, PERCUTANEOUS: ICD-10-PCS | Performed by: INTERNAL MEDICINE

## 2018-10-12 PROCEDURE — 30233R1 TRANSFUSION OF NONAUTOLOGOUS PLATELETS INTO PERIPHERAL VEIN, PERCUTANEOUS APPROACH: ICD-10-PCS | Performed by: INTERNAL MEDICINE

## 2018-10-12 RX ADMIN — IMMUNE GLOBULIN SCH MLS/HR: 100 SOLUTION INTRAVENOUS at 10:41

## 2018-10-12 RX ADMIN — DILTIAZEM HYDROCHLORIDE SCH MG: 300 CAPSULE, EXTENDED RELEASE ORAL at 09:16

## 2018-10-12 NOTE — CP.PCM.HP
<HarshadNicol - Last Filed: 10/12/18 09:18>





History of Present Illness





- History of Present Illness


History of Present Illness: 


H&P for PAMELA Donaldson PGY3





This is a 69yo male with past medical history of HTN, CLL, microangiopathic 

hemolytic anemia who came to ED for bleeding gums. Patient reports he had a 

routine teeth cleaning 3 days ago by his dentist. Since then his gums won't stop

bleeding. Patient denies chest pain, shortness of breath, recent travel, 

nausea/vomiting/diarrhea, dark color or blood in stools, numbness/tingling, 

fever/chills or change in medication. 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 He had BM biopsy in 2017 

which showed hypercellular bone marrow, but no evidence of lymphoma. 





Past medical history: HTN, CLL, microangiopathic hemolytic anemia


Past surgical history: Denies 


Home meds: Reviewed as per MAR


Allergies: NKDA


Social history: Denies EtOH, drug or tobacco use. Works as Light rail conductor.

Lives with family


Family history: Both parents  of "old age". Denies any bleeding disorders or

cancers in family 





Present on Admission





- Present on Admission


Any Indicators Present on Admission: No





Review of Systems





- Review of Systems


All systems: reviewed and no additional remarkable complaints except


Review of Systems: 





12 point ROS reviewed as per HPI and is otherwise negative. 





Past Patient History





- Infectious Disease


Hx of Infectious Diseases: None





- Past Social History


Smoking Status: Never Smoked





- CARDIAC


Hx Hypertension: Yes





- HEENT


Hx HEENT Problems: Yes (eyeglasses)





- MUSCULOSKELETAL/RHEUMATOLOGICAL


Hx Falls: No





- PSYCHIATRIC


Hx Substance Use: No





- SURGICAL HISTORY


Hx Surgeries: No





- ANESTHESIA


Hx Anesthesia: No





Meds


Allergies/Adverse Reactions: 


                                    Allergies











Allergy/AdvReac Type Severity Reaction Status Date / Time


 


No Known Allergies Allergy   Verified 10/12/18 04:28














Physical Exam





- Constitutional


Appears: No Acute Distress





- Head Exam


Head Exam: ATRAUMATIC, NORMAL INSPECTION, NORMOCEPHALIC





- Eye Exam


Eye Exam: Normal appearance, PERRL


Pupil Exam: NORMAL ACCOMODATION, PERRL





- ENT Exam


ENT Exam: Mucous Membranes Moist.  absent: Normal Exam


Additional comments: 





Bleeding gums 





- Neck Exam


Neck exam: Positive for: Full Rom, Normal Inspection.  Negative for: 

Lymphadenopathy, Tenderness





- Respiratory Exam


Respiratory Exam: Clear to Auscultation Bilateral, NORMAL BREATHING PATTERN.  

absent: Rales, Rhonchi, Wheezes





- Cardiovascular Exam


Cardiovascular Exam: REGULAR RHYTHM, +S1, +S2.  absent: Gallop, Rubs, Systolic 

Murmur





- GI/Abdominal Exam


GI & Abdominal Exam: Normal Bowel Sounds, Soft.  absent: Mass, Organomegaly, [place]

ound, Rigid, Tenderness





- Extremities Exam


Extremities exam: Positive for: normal capillary refill, normal inspection, 

pedal pulses present.  Negative for: calf tenderness, pedal edema





- Neurological Exam


Neurological exam: Alert, CN II-XII Intact, Oriented x3





- Psychiatric Exam


Psychiatric exam: Normal Affect, Normal Mood





- Skin


Skin Exam: Dry, Normal Color, Petechiae (on LE bilaterally ), Warm





Results





- Vital Signs


Recent Vital Signs: 





                                Last Vital Signs











Temp  98.3 F   10/12/18 04:25


 


Pulse  78   10/12/18 04:25


 


Resp  20   10/12/18 04:25


 


BP  150/107 H  10/12/18 04:25


 


Pulse Ox  100   10/12/18 04:25














- Labs


Result Diagrams: 


                                 10/12/18 04:40





                                 10/12/18 04:40


Labs: 





                         Laboratory Results - last 24 hr











  10/12/18 10/12/18 10/12/18





  04:40 04:40 04:40


 


WBC  2.4 L* D  


 


RBC  3.09 L  


 


Hgb  9.2 L D  


 


Hct  28.9 L  


 


MCV  93.5  D  


 


MCH  29.8  


 


MCHC  31.8  


 


RDW  18.7 H  


 


Plt Count  1 L*  


 


Gran %  59.6  


 


Lymph % (Auto)  18.5 L  


 


Mono % (Auto)  18.1 H  


 


Eos % (Auto)  1.7  


 


Baso % (Auto)  2.1  


 


Gran #  1.42  


 


Lymph # (Auto)  0.4 L  


 


Mono # (Auto)  0.4  


 


Eos # (Auto)  0.0  


 


Baso # (Auto)  0.05  


 


PT   12.9 H 


 


INR   1.12 


 


APTT   30.1 


 


Sodium    139


 


Potassium    3.9


 


Chloride    105


 


Carbon Dioxide    23


 


Anion Gap    14


 


BUN    16


 


Creatinine    1.4


 


Est GFR ( Amer)    > 60


 


Est GFR (Non-Af Amer)    50


 


Random Glucose    126 H


 


Calcium    8.8


 


Total Bilirubin    0.8


 


AST    18


 


ALT    20


 


Alkaline Phosphatase    65


 


Total Protein    8.0


 


Albumin    4.3


 


Globulin    3.7


 


Albumin/Globulin Ratio    1.2














Assessment & Plan





- Assessment and Plan (Free Text)


Assessment: 


1. Severe thrombocytopenia 


   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 


2. CLL


   - had tx of steroids and Rituxin a couple months ago 


3. Microangiopathic hemolytic anemia


   - Hgb stable 


4. HTN 


Plan: 


Labs and imaging reviewed. Previous CT did not show any splenomegaly. Heme on 

consult. I spoke with Dr. Johnson this AM. Patient will get 1U of leukophoresed 

platelets. Will give patient high dose steroids and immunoglobulin. Will place 

patient on Pepcid. Hgb is stable. Will continue home med Cardizem for HTN. 

Patient tolerating diet. Pain is controlled. Will monitor CBC. 





Case seen, discussed and reviewed with Dr. Екатерина Patricia PGY3





- Date & Time


Date: 10/12/18


Time: 09:23





<Dimitri Willams - Last Filed: 10/15/18 21:34>





Results





- Vital Signs


Recent Vital Signs: 





                                Last Vital Signs











Temp  98.7 F   10/15/18 16:41


 


Pulse  55 L  10/15/18 16:41


 


Resp  20   10/15/18 16:41


 


BP  131/69   10/15/18 16:41


 


Pulse Ox  97   10/15/18 16:41














- Labs


Result Diagrams: 


                                 10/14/18 07:00





                                 10/13/18 04:15


Labs: 





                         Laboratory Results - last 24 hr











  10/12/18





  04:40


 


Blood Type  A POSITIVE


 


Antibody Screen  Positive


 


Antibody Identification  WARM AUTO ANTIBODY


 


Antigen Identification  A1 Antigen - POSITIVE


 


ASIF, Poly Interpret  Positive H


 


BBK History Checked  Patient has bt














Assessment & Plan





- Assessment and Plan (Free Text)


Plan: 





Pt seen and examined. I have reviewed the note of the medical resident and agree

with it. I have discussed the assessment and plan with the resident.  I have 

reviewed the patient's labs and medications. Pt with CLL and severe thr

ombocytpenia. I did speak with Dr Johnson regarding the case. He will need 

Steroids and Gamma globulins. He will need CBC to follow platelets. Pt will be 

placed in the ICU. CT was done and reviewed.

## 2018-10-12 NOTE — CP.PCM.CON
<BerryTyrone - Last Filed: 10/12/18 08:49>





History of Present Illness





- History of Present Illness


History of Present Illness: 


ICU Consultation (Dr. Curry): Adeola PGY2





CC: Thrombocytopenia





HPI: Mr. Velez is a 68 year old male with a past medical history significant 

for HTN and Vitamin D deficiency who presented with gum bleeding for three days.

Patient reports he was seen at his dentist three days ago for a regular cleaning

and was told that his gums were "inflamed". During this appointment, his dentist

cut his gums and he has had gingival bleeding since that time. When patient 

awoke this morning for work, his bleeding still hadn't resolved and he decided 

that he needed further evaluation. He denies ever having this issue in the past.

He also endorses a non-itchy and non-painful rash on his bilateral lower 

extremities that started several days ago. He reports that he has been seen by a

Hematologist in the past year after he was found to have hemolytic anemia. At 

that time a bone marrow biopsy was done that he was told was "normal". He does 

note that he was started on Prednisone and stopped after a few weeks. Currently 

he denies any recent travel, sick contacts, fevers, chills, headache, changes in

his vision, neck pain/stiffness, chest pain, palpitations, SOB, cough, wheezing,

hemoptysis, abdominal pain, N/V/D/C, melena, hematochezia, hematemesis, changes 

in urine output, hematuria, joint pain, bleeding joints, or any 

numbness/tingling/weakness of any extremity.





PMH: As stated above


PSH: Denies


Family History: Denies any history of cancer, hematologic disorders 


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 

Conductor


Allergies: NKDA


Home Medications: Cardizem 300mg PO QD





PMD: Dr. Medina





Review of Systems





- Review of Systems


Review of Systems: 


As stated in the HPI, otherwise negative





Past Patient History





- Infectious Disease


Hx of Infectious Diseases: None





- Past Social History


Smoking Status: Never Smoked





- CARDIAC


Hx Hypertension: Yes





- HEENT


Hx HEENT Problems: Yes (eyeglasses)





- MUSCULOSKELETAL/RHEUMATOLOGICAL


Hx Falls: No





- PSYCHIATRIC


Hx Substance Use: No





- SURGICAL HISTORY


Hx Surgeries: No





- ANESTHESIA


Hx Anesthesia: No





Meds


Allergies/Adverse Reactions: 


                                    Allergies











Allergy/AdvReac Type Severity Reaction Status Date / Time


 


No Known Allergies Allergy   Verified 10/12/18 04:28














Physical Exam





- Constitutional


Appears: Non-toxic, No Acute Distress





- Head Exam


Head Exam: ATRAUMATIC, NORMOCEPHALIC





- Eye Exam


Eye Exam: EOMI, Normal appearance, PERRL.  absent: Conjunctival injection, 

Nystagmus, Periorbital swelling, Periorbital tenderness, Scleral icterus


Pupil Exam: NORMAL ACCOMODATION, PERRL





- ENT Exam


Additional comments: 


Gingival hemorrhage on upper and lower gingiva





- Neck Exam


Neck exam: Positive for: Full Rom, Normal Inspection.  Negative for: 

Lymphadenopathy, Meningismus, Tenderness, Thyromegaly





- Respiratory Exam


Respiratory Exam: Clear to Auscultation Bilateral, NORMAL BREATHING PATTERN.  

absent: Accessory Muscle Use, Chest Wall Tenderness, Decreased Breath Sounds, 

Prolonged Expiratory Phase, Rales, Rhonchi, Wheezes, Respiratory Distress, 

Stridor





- Cardiovascular Exam


Cardiovascular Exam: REGULAR RHYTHM, RRR, +S1, +S2.  absent: Bradycardia, 

Tachycardia, Clicks, Diastolic murmur, Gallop, Irregular Rhythm, JVD, Rubs, +S4,

Systolic Murmur





- GI/Abdominal Exam


GI & Abdominal Exam: Normal Bowel Sounds, Soft.  absent: Bruit, Diminished Bowel

Sounds, Distended, Firm, Guarding, Hernia, Hyperactive Bowel Sounds, Hypoactive 

Bowel Sounds, Mass, Organomegaly, Pulsatile Mass, Rebound, Rigid, Tenderness





- Extremities Exam


Extremities exam: Positive for: full ROM, normal capillary refill, pedal pulses 

present.  Negative for: calf tenderness, joint swelling, pedal edema, tenderness


Additional comments: 


petechial rash on bilateral lower extremities extending to the mid calves





- Back Exam


Back exam: FULL ROM, NORMAL INSPECTION.  absent: CVA tenderness (L), CVA 

tenderness (R), muscle spasm, paraspinal tenderness, rash noted, tenderness, 

vertebral tenderness





- Neurological Exam


Neurological exam: Alert, CN II-XII Intact, Normal Gait, Oriented x3





- Psychiatric Exam


Psychiatric exam: Normal Affect, Normal Mood





- Skin


Skin Exam: Dry, Intact, Warm





Results





- Vital Signs


Recent Vital Signs: 


                                Last Vital Signs











Temp  98.3 F   10/12/18 04:25


 


Pulse  78   10/12/18 04:25


 


Resp  20   10/12/18 04:25


 


BP  150/107 H  10/12/18 04:25


 


Pulse Ox  100   10/12/18 04:25














- Labs


Result Diagrams: 


                                 10/12/18 04:40





                                 10/12/18 04:40


Labs: 


                         Laboratory Results - last 24 hr











  10/12/18 10/12/18 10/12/18





  04:40 04:40 04:40


 


WBC  2.4 L* D  


 


RBC  3.09 L  


 


Hgb  9.2 L D  


 


Hct  28.9 L  


 


MCV  93.5  D  


 


MCH  29.8  


 


MCHC  31.8  


 


RDW  18.7 H  


 


Plt Count  1 L*  


 


Gran %  59.6  


 


Lymph % (Auto)  18.5 L  


 


Mono % (Auto)  18.1 H  


 


Eos % (Auto)  1.7  


 


Baso % (Auto)  2.1  


 


Gran #  1.42  


 


Lymph # (Auto)  0.4 L  


 


Mono # (Auto)  0.4  


 


Eos # (Auto)  0.0  


 


Baso # (Auto)  0.05  


 


PT   12.9 H 


 


INR   1.12 


 


APTT   30.1 


 


Sodium    139


 


Potassium    3.9


 


Chloride    105


 


Carbon Dioxide    23


 


Anion Gap    14


 


BUN    16


 


Creatinine    1.4


 


Est GFR ( Amer)    > 60


 


Est GFR (Non-Af Amer)    50


 


Random Glucose    126 H


 


Calcium    8.8


 


Total Bilirubin    0.8


 


AST    18


 


ALT    20


 


Alkaline Phosphatase    65


 


Total Protein    8.0


 


Albumin    4.3


 


Globulin    3.7


 


Albumin/Globulin Ratio    1.2














Assessment & Plan





- Assessment and Plan (Free Text)


Assessment: 


68 year old male with a past medical history significant for B-cell lymphoma, 

HTN and Vitamin D deficiency who presented with gum bleeding for three days. He 

was found to have a thrombocytopenia of one on a CBC in the ED.


Plan: 


1. Thrombocytopenia


-Daily CBC's


-Peripheral Smear and Manual Platelet Count pending


-125mg Methylprednisolone given in ED


-Hematology consulted, all recommendations appreciated





2. History of HTN


-Continue home Cardizem





DVT Prophylaxis: SCD's


Diet: NPO Except Medications





Patient seen and case discussed with attending, Dr. Curry. 





- Date & Time


Date: 10/12/18


Time: 05:43





<Jose L Curry - Last Filed: 10/14/18 06:14>





Results





- Vital Signs


Recent Vital Signs: 


                                Last Vital Signs











Temp  98.4 F   10/13/18 17:17


 


Pulse  72   10/13/18 17:17


 


Resp  19   10/13/18 17:17


 


BP  153/77 H  10/13/18 17:17


 


Pulse Ox  99   10/13/18 17:17














- Labs


Result Diagrams: 


                                 10/13/18 12:00





                                 10/13/18 04:15


Labs: 


                         Laboratory Results - last 24 hr











  10/13/18 10/13/18





  08:25 12:00


 


WBC  3.4 L  3.4 L


 


RBC  2.74 L  2.74 L


 


Hgb  8.2 L  8.2 L


 


Hct  25.4 L  25.4 L


 


MCV  92.7  92.7


 


MCH  29.9  29.9


 


MCHC  32.3  32.3


 


RDW  18.3 H  18.3 H


 


Plt Count  6 L*  6 L*


 


Gran %  85.2 H  85.2 H


 


Lymph % (Auto)  8.3 L  8.3 L


 


Mono % (Auto)  6.5 H  6.5 H


 


Eos % (Auto)  0.0 L  0.0 L


 


Baso % (Auto)  0.0  0.0


 


Gran #  2.86  2.86


 


Lymph # (Auto)  0.3 L  0.3 L


 


Mono # (Auto)  0.2  0.2


 


Eos # (Auto)  0.0  0.0


 


Baso # (Auto)  0.00  0.00


 


Platelet Evaluation  8  8














Attending/Attestation





- Attestation


I have personally seen and examined this patient.: Yes


I have fully participated in the care of the patient.: Yes


I have reviewed all pertinent clinical information: Yes

## 2018-10-12 NOTE — CP.CCUPN
<Severino Contreras - Last Filed: 10/12/18 12:07>





CCU Subjective





- Physician Review


Subjective (Free Text): 


Severino Contreras DO, PGY-1 ICU Progress Note for Dr. KUN Perez


Mr. Velez is a 67 yo M with PMH of HTN, vitamin D deficiency, and hemolytic 

anemia who presented to ED with persistent gum bleeding since his dental 

appointment 3 days ago. He admits that he had a routine dental cleaning 3 days 

ago and his gums have been bleeding persistently since. He works as a conductor 

for the light [employer] and was unable to go to work yesterday because he continued 

to bleed. He states that he has had bleeding in the past and has seen Dr. Johnson 

for this condition in the past. He states that he has a history of anemia and 

that he gets tired and short of breath when he is anemic. He states that he had 

a bone marrow biopsy completed in the past and states that it was normal. The 

bleeding gums worried him so he states he made an appointment with Dr. Johnson 

which is in early November. 


He otherwise has no complaints and denies fever/chills, CP, SOB, dyspnea on 

exertion, nausea/vomiting/diarrhea, hematemesis, and hemoptysis.








CCU Objective





- Vital Signs / Intake & Output


Intake and Output (Last 8hrs): 


                                 Intake & Output











 10/11/18 10/12/18 10/12/18





 22:59 06:59 14:59


 


Weight  190 lb 














- Physical Exam


Head: Positive for: Atraumatic, Normocephalic


Pupils: Positive for: PERRL


Extroacular Muscles: Positive for: EOMI


Conjunctiva: Positive for: Normal


Mouth: Positive for: Moist Mucous Membranes, Other (bleeding gums throughout 

mouth)


Pharnyx: Negative for: ERYTHEMA, EXUDATE


Neck: Positive for: Normal Range of Motion.  Negative for: JVD


Respiratory/Chest: Positive for: Clear to Auscultation, Good Air Exchange.  

Negative for: Respiratory Distress, Accessory Muscle Use


Cardiovascular: Positive for: Regular Rate and Rhythm, Normal S1, S2.  Negative 

for: Murmurs


Abdomen: Negative for: Tenderness, Distention, Rebound, Guarding, 

Mass/Organomegaly


Upper Extremity: Positive for: Normal Inspection.  Negative for: Cyanosis, Edema


Lower Extremity: Positive for: Normal Inspection.  Negative for: Edema


Neurological: Positive for: Speech Normal, Motor Func Grossly Intact


Skin: Positive for: Rashes (numerous petechiae noted on lower legs bilaterally)


Psychiatric: Positive for: Alert, Oriented x 3, Normal Insight, Normal 

Concentration





- Medications


Active Medications: 


Active Medications











Generic Name Dose Route Start Last Admin





  Trade Name Justiceq  PRN Reason Stop Dose Admin


 


Diltiazem HCl  300 mg  10/12/18 10:00  





  Cardizem Cd  PO   





  DAILY GIANNI   





     





     





     





     


 


Immune Globulin  300 mls @ 50 mls/hr  10/12/18 10:00  





  Octagam 10%  IV  10/16/18 15:59  





  DAILY GIANNI   





     





     





     





     


 


Methylprednisolone  125 mg  10/12/18 14:00  





  Solu-Medrol  IVP   





  Q8 GIANNI   





     





     





     





     














- Patient Studies


Lab Studies: 


                                   Lab Studies











  10/12/18 10/12/18 10/12/18 Range/Units





  05:00 04:40 04:40 


 


WBC     (4.5-11.0)  10^3/ul


 


RBC     (3.5-6.1)  10^6/uL


 


Hgb     (14.0-18.0)  g/dL


 


Hct     (42.0-52.0)  %


 


MCV     (80.0-105.0)  fl


 


MCH     (25.0-35.0)  pg


 


MCHC     (31.0-37.0)  g/dl


 


RDW     (11.5-14.5)  %


 


Plt Count     (120.0-450.0)  10^3/uL


 


Manual Plt Count  2 L*    (120-450)  K/mm3


 


Gran %     (50.0-68.0)  %


 


Lymph % (Auto)     (22.0-35.0)  %


 


Mono % (Auto)     (1.0-6.0)  %


 


Eos % (Auto)     (1.5-5.0)  %


 


Baso % (Auto)     (0.0-3.0)  %


 


Gran #     (1.4-6.5)  


 


Lymph # (Auto)     (1.2-3.4)  


 


Mono # (Auto)     (0.1-0.6)  


 


Eos # (Auto)     (0.0-0.7)  


 


Baso # (Auto)     (0.0-2.0)  K/mm3


 


Differential Comment  See pathology report    


 


Retic Count    5.05 H  (0.5-1.5)  %


 


PT     (9.4-12.5)  SECONDS


 


INR     


 


APTT     (25.1-36.5)  Seconds


 


Fibrinogen   269   (200-400)  mg/dl


 


Sodium     (132-148)  mmol/L


 


Potassium     (3.6-5.0)  mmol/L


 


Chloride     ()  mmol/L


 


Carbon Dioxide     (21-33)  mmol/L


 


Anion Gap     (10-20)  


 


BUN     (7-21)  mg/dL


 


Creatinine     (0.8-1.5)  mg/dl


 


Est GFR (African Amer)     


 


Est GFR (Non-Af Amer)     


 


Random Glucose     ()  mg/dL


 


Calcium     (8.4-10.5)  mg/dL


 


Total Bilirubin     (0.2-1.3)  mg/dL


 


AST     (17-59)  U/L


 


ALT     (7-56)  U/L


 


Alkaline Phosphatase     ()  U/L


 


Total Protein     (5.8-8.3)  g/dL


 


Albumin     (3.0-4.8)  g/dL


 


Globulin     gm/dL


 


Albumin/Globulin Ratio     (1.1-1.8)  


 


Blood Type     


 


Antibody Screen     


 


BBK History Checked     














  10/12/18 10/12/18 10/12/18 Range/Units





  04:40 04:40 04:40 


 


WBC     (4.5-11.0)  10^3/ul


 


RBC     (3.5-6.1)  10^6/uL


 


Hgb     (14.0-18.0)  g/dL


 


Hct     (42.0-52.0)  %


 


MCV     (80.0-105.0)  fl


 


MCH     (25.0-35.0)  pg


 


MCHC     (31.0-37.0)  g/dl


 


RDW     (11.5-14.5)  %


 


Plt Count     (120.0-450.0)  10^3/uL


 


Manual Plt Count     (120-450)  K/mm3


 


Gran %     (50.0-68.0)  %


 


Lymph % (Auto)     (22.0-35.0)  %


 


Mono % (Auto)     (1.0-6.0)  %


 


Eos % (Auto)     (1.5-5.0)  %


 


Baso % (Auto)     (0.0-3.0)  %


 


Gran #     (1.4-6.5)  


 


Lymph # (Auto)     (1.2-3.4)  


 


Mono # (Auto)     (0.1-0.6)  


 


Eos # (Auto)     (0.0-0.7)  


 


Baso # (Auto)     (0.0-2.0)  K/mm3


 


Differential Comment     


 


Retic Count     (0.5-1.5)  %


 


PT    12.9 H  (9.4-12.5)  SECONDS


 


INR    1.12  


 


APTT    30.1  (25.1-36.5)  Seconds


 


Fibrinogen     (200-400)  mg/dl


 


Sodium   139   (132-148)  mmol/L


 


Potassium   3.9   (3.6-5.0)  mmol/L


 


Chloride   105   ()  mmol/L


 


Carbon Dioxide   23   (21-33)  mmol/L


 


Anion Gap   14   (10-20)  


 


BUN   16   (7-21)  mg/dL


 


Creatinine   1.4   (0.8-1.5)  mg/dl


 


Est GFR (African Amer)   > 60   


 


Est GFR (Non-Af Amer)   50   


 


Random Glucose   126 H   ()  mg/dL


 


Calcium   8.8   (8.4-10.5)  mg/dL


 


Total Bilirubin   0.8   (0.2-1.3)  mg/dL


 


AST   18   (17-59)  U/L


 


ALT   20   (7-56)  U/L


 


Alkaline Phosphatase   65   ()  U/L


 


Total Protein   8.0   (5.8-8.3)  g/dL


 


Albumin   4.3   (3.0-4.8)  g/dL


 


Globulin   3.7   gm/dL


 


Albumin/Globulin Ratio   1.2   (1.1-1.8)  


 


Blood Type  A POSITIVE    


 


Antibody Screen  Positive    


 


BBK History Checked  Patient has bt    














  10/12/18 Range/Units





  04:40 


 


WBC  2.4 L* D  (4.5-11.0)  10^3/ul


 


RBC  3.09 L  (3.5-6.1)  10^6/uL


 


Hgb  9.2 L D  (14.0-18.0)  g/dL


 


Hct  28.9 L  (42.0-52.0)  %


 


MCV  93.5  D  (80.0-105.0)  fl


 


MCH  29.8  (25.0-35.0)  pg


 


MCHC  31.8  (31.0-37.0)  g/dl


 


RDW  18.7 H  (11.5-14.5)  %


 


Plt Count  1 L*  (120.0-450.0)  10^3/uL


 


Manual Plt Count   (120-450)  K/mm3


 


Gran %  59.6  (50.0-68.0)  %


 


Lymph % (Auto)  18.5 L  (22.0-35.0)  %


 


Mono % (Auto)  18.1 H  (1.0-6.0)  %


 


Eos % (Auto)  1.7  (1.5-5.0)  %


 


Baso % (Auto)  2.1  (0.0-3.0)  %


 


Gran #  1.42  (1.4-6.5)  


 


Lymph # (Auto)  0.4 L  (1.2-3.4)  


 


Mono # (Auto)  0.4  (0.1-0.6)  


 


Eos # (Auto)  0.0  (0.0-0.7)  


 


Baso # (Auto)  0.05  (0.0-2.0)  K/mm3


 


Differential Comment   


 


Retic Count   (0.5-1.5)  %


 


PT   (9.4-12.5)  SECONDS


 


INR   


 


APTT   (25.1-36.5)  Seconds


 


Fibrinogen   (200-400)  mg/dl


 


Sodium   (132-148)  mmol/L


 


Potassium   (3.6-5.0)  mmol/L


 


Chloride   ()  mmol/L


 


Carbon Dioxide   (21-33)  mmol/L


 


Anion Gap   (10-20)  


 


BUN   (7-21)  mg/dL


 


Creatinine   (0.8-1.5)  mg/dl


 


Est GFR (African Amer)   


 


Est GFR (Non-Af Amer)   


 


Random Glucose   ()  mg/dL


 


Calcium   (8.4-10.5)  mg/dL


 


Total Bilirubin   (0.2-1.3)  mg/dL


 


AST   (17-59)  U/L


 


ALT   (7-56)  U/L


 


Alkaline Phosphatase   ()  U/L


 


Total Protein   (5.8-8.3)  g/dL


 


Albumin   (3.0-4.8)  g/dL


 


Globulin   gm/dL


 


Albumin/Globulin Ratio   (1.1-1.8)  


 


Blood Type   


 


Antibody Screen   


 


BBK History Checked   








                         Laboratory Results - last 24 hr











  10/12/18 10/12/18 10/12/18





  04:40 04:40 04:40


 


WBC  2.4 L* D  


 


RBC  3.09 L  


 


Hgb  9.2 L D  


 


Hct  28.9 L  


 


MCV  93.5  D  


 


MCH  29.8  


 


MCHC  31.8  


 


RDW  18.7 H  


 


Plt Count  1 L*  


 


Manual Plt Count   


 


Gran %  59.6  


 


Lymph % (Auto)  18.5 L  


 


Mono % (Auto)  18.1 H  


 


Eos % (Auto)  1.7  


 


Baso % (Auto)  2.1  


 


Gran #  1.42  


 


Lymph # (Auto)  0.4 L  


 


Mono # (Auto)  0.4  


 


Eos # (Auto)  0.0  


 


Baso # (Auto)  0.05  


 


Differential Comment   


 


Retic Count   


 


PT   12.9 H 


 


INR   1.12 


 


APTT   30.1 


 


Fibrinogen   


 


Sodium    139


 


Potassium    3.9


 


Chloride    105


 


Carbon Dioxide    23


 


Anion Gap    14


 


BUN    16


 


Creatinine    1.4


 


Est GFR ( Amer)    > 60


 


Est GFR (Non-Af Amer)    50


 


Random Glucose    126 H


 


Calcium    8.8


 


Total Bilirubin    0.8


 


AST    18


 


ALT    20


 


Alkaline Phosphatase    65


 


Total Protein    8.0


 


Albumin    4.3


 


Globulin    3.7


 


Albumin/Globulin Ratio    1.2


 


Blood Type   


 


Antibody Screen   


 


BBK History Checked   














  10/12/18 10/12/18 10/12/18





  04:40 04:40 04:40


 


WBC   


 


RBC   


 


Hgb   


 


Hct   


 


MCV   


 


MCH   


 


MCHC   


 


RDW   


 


Plt Count   


 


Manual Plt Count   


 


Gran %   


 


Lymph % (Auto)   


 


Mono % (Auto)   


 


Eos % (Auto)   


 


Baso % (Auto)   


 


Gran #   


 


Lymph # (Auto)   


 


Mono # (Auto)   


 


Eos # (Auto)   


 


Baso # (Auto)   


 


Differential Comment   


 


Retic Count   5.05 H 


 


PT   


 


INR   


 


APTT   


 


Fibrinogen    269


 


Sodium   


 


Potassium   


 


Chloride   


 


Carbon Dioxide   


 


Anion Gap   


 


BUN   


 


Creatinine   


 


Est GFR ( Amer)   


 


Est GFR (Non-Af Amer)   


 


Random Glucose   


 


Calcium   


 


Total Bilirubin   


 


AST   


 


ALT   


 


Alkaline Phosphatase   


 


Total Protein   


 


Albumin   


 


Globulin   


 


Albumin/Globulin Ratio   


 


Blood Type  A POSITIVE  


 


Antibody Screen  Positive  


 


BBK History Checked  Patient has bt  














  10/12/18





  05:00


 


WBC 


 


RBC 


 


Hgb 


 


Hct 


 


MCV 


 


MCH 


 


MCHC 


 


RDW 


 


Plt Count 


 


Manual Plt Count  2 L*


 


Gran % 


 


Lymph % (Auto) 


 


Mono % (Auto) 


 


Eos % (Auto) 


 


Baso % (Auto) 


 


Gran # 


 


Lymph # (Auto) 


 


Mono # (Auto) 


 


Eos # (Auto) 


 


Baso # (Auto) 


 


Differential Comment  See pathology report


 


Retic Count 


 


PT 


 


INR 


 


APTT 


 


Fibrinogen 


 


Sodium 


 


Potassium 


 


Chloride 


 


Carbon Dioxide 


 


Anion Gap 


 


BUN 


 


Creatinine 


 


Est GFR ( Amer) 


 


Est GFR (Non-Af Amer) 


 


Random Glucose 


 


Calcium 


 


Total Bilirubin 


 


AST 


 


ALT 


 


Alkaline Phosphatase 


 


Total Protein 


 


Albumin 


 


Globulin 


 


Albumin/Globulin Ratio 


 


Blood Type 


 


Antibody Screen 


 


BBK History Checked 














Review of Systems





- Constitutional


Constitutional: absent: Fever, Chills





- EENT


Eyes: absent: Blurred Vision





- Cardiovascular


Cardiovascular: absent: Chest Pain, Dyspnea





- Respiratory


Respiratory: absent: Cough, Dyspnea





- Gastrointestinal


Gastrointestinal: absent: Abdominal Pain, Nausea, Vomiting





- Genitourinary


Genitourinary: absent: Change in Urinary Stream





- Integumentary


Integumentary: Other (petechiae)





Critical Care Progress Note





- Nutrition


Nutrition: 


                                    Nutrition











 Category Date Time Status


 


 NPO Diet [DIET] Diets  10/12/18 Breakfast Ordered














Assessment/Plan





- Assessment and Plan (Free Text)


Assessment: 





67 yo M with PMH of HTN, vitamin D deficiency, hemolytic anemia presents with 

severe thrombocytopenia (platelets of 1). He is admitted to the ICU for further 

management.


Plan: 





Neuro: 


-AAOx3, no FND, moving extremities past midline. 


-Monitor neuro status. 


-Reorient patient as necessary 





Heme/Onc:


-Severe thrombocytopenia (platelets of 1) noted on admission to ED


-Dr. Johnson evaluated patient this AM


-Dr. Johnson states that thrombocytopenia is 2/2 underlying CLL


-He was positive for CLL markers on flow cytometry


-Per Dr. Johnson, cleared to give 1 unit of platelets


-Will continue to monitor CBC Q4h


-Will f/u Dr. Johnson recs


-Patient has no signs of active bleeding


-Stable to transfer to med/surg





Cardio: 


-RRR, normotensive, no signs of HD compromise 


-Maintain MAP>65 


-Monitor for S/S, HD compromise 





Pulm: 


-No signs of respiratory distress. CTA B/L 


-Patient is stating well on room air


-Maintain O2 saturation>95% 


-O2 NC PRN





GI: 


-Tolerating diet well 


-Use protonix for GI PPX for concern of worsened thrombocytopenia with pepcid 





/Nephro: 


-BUN/Cr stable at 16/1.4


-Monitor UOP


-Replete electrolytes as needed 


-Maintain euvolemia 





Endocrinology: 


-Maintain euglycemia





ID: 


-Afebrile, no leukocytosis  


-Monitor for signs and symptoms of infection 





GI PPX: Protonix, hold DVT PPX for thrombocytopenia


Full Code


Transfer to med/surg





Case and plan reviewed and discussed with my attending Dr. KUN Contreras, DO


IM Resident PGY-1





<Gabriela Perez - Last Filed: 10/12/18 15:55>





CCU Objective





- Vital Signs / Intake & Output


Vital Signs (Last 4 hours): 


Vital Signs











  Temp Pulse


 


 10/12/18 14:00   71


 


 10/12/18 12:00  99.1 F 











Intake and Output (Last 8hrs): 


                                 Intake & Output











 10/12/18 10/12/18 10/12/18





 06:59 14:59 22:59


 


Intake Total  222 


 


Balance  222 


 


Weight 86.183 kg  


 


Intake:   


 


  Blood Product  202 


 


    Apheresis Plts Acda Lr  202 





    Irr  Unit [id number]   


 


  Other  20 


 


    Apheresis Plts Acda Lr  20 





    Irr  Unit [id number]   














- Medications


Active Medications: 


Active Medications











Generic Name Dose Route Start Last Admin





  Trade Name Freq  PRN Reason Stop Dose Admin


 


Diltiazem HCl  300 mg  10/12/18 10:00  10/12/18 09:16





  Cardizem Cd  PO   300 mg





  DAILY GIANNI   Administration





     





     





     





     


 


Immune Globulin  300 mls @ 50 mls/hr  10/12/18 10:00  10/12/18 10:41





  Octagam 10%  IV  10/16/18 15:59  50 mls/hr





  DAILY GIANNI   Administration





     





     





     





     


 


Methylprednisolone  125 mg  10/12/18 14:00  10/12/18 13:09





  Solu-Medrol  IVP   125 mg





  Q8 GIANNI   Administration





     





     





     





     


 


Pantoprazole Sodium  40 mg  10/12/18 10:15  10/12/18 10:43





  Protonix Inj  IVP   40 mg





  DAILY GIANNI   Administration





     





     





     





     














- Patient Studies


Lab Studies: 


                                   Lab Studies











  10/12/18 10/12/18 10/12/18 Range/Units





  05:00 04:40 04:40 


 


WBC     (4.5-11.0)  10^3/ul


 


RBC     (3.5-6.1)  10^6/uL


 


Hgb     (14.0-18.0)  g/dL


 


Hct     (42.0-52.0)  %


 


MCV     (80.0-105.0)  fl


 


MCH     (25.0-35.0)  pg


 


MCHC     (31.0-37.0)  g/dl


 


RDW     (11.5-14.5)  %


 


Plt Count     (120.0-450.0)  10^3/uL


 


Manual Plt Count  2 L*    (120-450)  K/mm3


 


Gran %     (50.0-68.0)  %


 


Lymph % (Auto)     (22.0-35.0)  %


 


Mono % (Auto)     (1.0-6.0)  %


 


Eos % (Auto)     (1.5-5.0)  %


 


Baso % (Auto)     (0.0-3.0)  %


 


Gran #     (1.4-6.5)  


 


Lymph # (Auto)     (1.2-3.4)  


 


Mono # (Auto)     (0.1-0.6)  


 


Eos # (Auto)     (0.0-0.7)  


 


Baso # (Auto)     (0.0-2.0)  K/mm3


 


Differential Comment  See pathology report    


 


Retic Count    5.05 H  (0.5-1.5)  %


 


PT     (9.4-12.5)  SECONDS


 


INR     


 


APTT     (25.1-36.5)  Seconds


 


Fibrinogen   269   (200-400)  mg/dl


 


Sodium     (132-148)  mmol/L


 


Potassium     (3.6-5.0)  mmol/L


 


Chloride     ()  mmol/L


 


Carbon Dioxide     (21-33)  mmol/L


 


Anion Gap     (10-20)  


 


BUN     (7-21)  mg/dL


 


Creatinine     (0.8-1.5)  mg/dl


 


Est GFR (African Amer)     


 


Est GFR (Non-Af Amer)     


 


Random Glucose     ()  mg/dL


 


Calcium     (8.4-10.5)  mg/dL


 


Total Bilirubin     (0.2-1.3)  mg/dL


 


AST     (17-59)  U/L


 


ALT     (7-56)  U/L


 


Alkaline Phosphatase     ()  U/L


 


Total Protein     (5.8-8.3)  g/dL


 


Albumin     (3.0-4.8)  g/dL


 


Globulin     gm/dL


 


Albumin/Globulin Ratio     (1.1-1.8)  


 


Blood Type     


 


Antibody Screen     


 


Antibody Identification     


 


ASIF, Poly Interpret     (NEGATIVE)  


 


BBK History Checked     














  10/12/18 10/12/18 10/12/18 Range/Units





  04:40 04:40 04:40 


 


WBC     (4.5-11.0)  10^3/ul


 


RBC     (3.5-6.1)  10^6/uL


 


Hgb     (14.0-18.0)  g/dL


 


Hct     (42.0-52.0)  %


 


MCV     (80.0-105.0)  fl


 


MCH     (25.0-35.0)  pg


 


MCHC     (31.0-37.0)  g/dl


 


RDW     (11.5-14.5)  %


 


Plt Count     (120.0-450.0)  10^3/uL


 


Manual Plt Count     (120-450)  K/mm3


 


Gran %     (50.0-68.0)  %


 


Lymph % (Auto)     (22.0-35.0)  %


 


Mono % (Auto)     (1.0-6.0)  %


 


Eos % (Auto)     (1.5-5.0)  %


 


Baso % (Auto)     (0.0-3.0)  %


 


Gran #     (1.4-6.5)  


 


Lymph # (Auto)     (1.2-3.4)  


 


Mono # (Auto)     (0.1-0.6)  


 


Eos # (Auto)     (0.0-0.7)  


 


Baso # (Auto)     (0.0-2.0)  K/mm3


 


Differential Comment     


 


Retic Count     (0.5-1.5)  %


 


PT    12.9 H  (9.4-12.5)  SECONDS


 


INR    1.12  


 


APTT    30.1  (25.1-36.5)  Seconds


 


Fibrinogen     (200-400)  mg/dl


 


Sodium   139   (132-148)  mmol/L


 


Potassium   3.9   (3.6-5.0)  mmol/L


 


Chloride   105   ()  mmol/L


 


Carbon Dioxide   23   (21-33)  mmol/L


 


Anion Gap   14   (10-20)  


 


BUN   16   (7-21)  mg/dL


 


Creatinine   1.4   (0.8-1.5)  mg/dl


 


Est GFR (African Amer)   > 60   


 


Est GFR (Non-Af Amer)   50   


 


Random Glucose   126 H   ()  mg/dL


 


Calcium   8.8   (8.4-10.5)  mg/dL


 


Total Bilirubin   0.8   (0.2-1.3)  mg/dL


 


AST   18   (17-59)  U/L


 


ALT   20   (7-56)  U/L


 


Alkaline Phosphatase   65   ()  U/L


 


Total Protein   8.0   (5.8-8.3)  g/dL


 


Albumin   4.3   (3.0-4.8)  g/dL


 


Globulin   3.7   gm/dL


 


Albumin/Globulin Ratio   1.2   (1.1-1.8)  


 


Blood Type  A POSITIVE    


 


Antibody Screen  Positive    


 


Antibody Identification  WARM AUTO ANTIBODY    


 


ASIF, Poly Interpret  Positive H    (NEGATIVE)  


 


BBK History Checked  Patient has bt    














  10/12/18 Range/Units





  04:40 


 


WBC  2.4 L* D  (4.5-11.0)  10^3/ul


 


RBC  3.09 L  (3.5-6.1)  10^6/uL


 


Hgb  9.2 L D  (14.0-18.0)  g/dL


 


Hct  28.9 L  (42.0-52.0)  %


 


MCV  93.5  D  (80.0-105.0)  fl


 


MCH  29.8  (25.0-35.0)  pg


 


MCHC  31.8  (31.0-37.0)  g/dl


 


RDW  18.7 H  (11.5-14.5)  %


 


Plt Count  1 L*  (120.0-450.0)  10^3/uL


 


Manual Plt Count   (120-450)  K/mm3


 


Gran %  59.6  (50.0-68.0)  %


 


Lymph % (Auto)  18.5 L  (22.0-35.0)  %


 


Mono % (Auto)  18.1 H  (1.0-6.0)  %


 


Eos % (Auto)  1.7  (1.5-5.0)  %


 


Baso % (Auto)  2.1  (0.0-3.0)  %


 


Gran #  1.42  (1.4-6.5)  


 


Lymph # (Auto)  0.4 L  (1.2-3.4)  


 


Mono # (Auto)  0.4  (0.1-0.6)  


 


Eos # (Auto)  0.0  (0.0-0.7)  


 


Baso # (Auto)  0.05  (0.0-2.0)  K/mm3


 


Differential Comment   


 


Retic Count   (0.5-1.5)  %


 


PT   (9.4-12.5)  SECONDS


 


INR   


 


APTT   (25.1-36.5)  Seconds


 


Fibrinogen   (200-400)  mg/dl


 


Sodium   (132-148)  mmol/L


 


Potassium   (3.6-5.0)  mmol/L


 


Chloride   ()  mmol/L


 


Carbon Dioxide   (21-33)  mmol/L


 


Anion Gap   (10-20)  


 


BUN   (7-21)  mg/dL


 


Creatinine   (0.8-1.5)  mg/dl


 


Est GFR (African Amer)   


 


Est GFR (Non-Af Amer)   


 


Random Glucose   ()  mg/dL


 


Calcium   (8.4-10.5)  mg/dL


 


Total Bilirubin   (0.2-1.3)  mg/dL


 


AST   (17-59)  U/L


 


ALT   (7-56)  U/L


 


Alkaline Phosphatase   ()  U/L


 


Total Protein   (5.8-8.3)  g/dL


 


Albumin   (3.0-4.8)  g/dL


 


Globulin   gm/dL


 


Albumin/Globulin Ratio   (1.1-1.8)  


 


Blood Type   


 


Antibody Screen   


 


Antibody Identification   


 


ASIF, Poly Interpret   (NEGATIVE)  


 


BBK History Checked   








                         Laboratory Results - last 24 hr











  10/12/18 10/12/18 10/12/18





  04:40 04:40 04:40


 


WBC  2.4 L* D  


 


RBC  3.09 L  


 


Hgb  9.2 L D  


 


Hct  28.9 L  


 


MCV  93.5  D  


 


MCH  29.8  


 


MCHC  31.8  


 


RDW  18.7 H  


 


Plt Count  1 L*  


 


Manual Plt Count   


 


Gran %  59.6  


 


Lymph % (Auto)  18.5 L  


 


Mono % (Auto)  18.1 H  


 


Eos % (Auto)  1.7  


 


Baso % (Auto)  2.1  


 


Gran #  1.42  


 


Lymph # (Auto)  0.4 L  


 


Mono # (Auto)  0.4  


 


Eos # (Auto)  0.0  


 


Baso # (Auto)  0.05  


 


Differential Comment   


 


Retic Count   


 


PT   12.9 H 


 


INR   1.12 


 


APTT   30.1 


 


Fibrinogen   


 


Sodium    139


 


Potassium    3.9


 


Chloride    105


 


Carbon Dioxide    23


 


Anion Gap    14


 


BUN    16


 


Creatinine    1.4


 


Est GFR ( Amer)    > 60


 


Est GFR (Non-Af Amer)    50


 


Random Glucose    126 H


 


Calcium    8.8


 


Total Bilirubin    0.8


 


AST    18


 


ALT    20


 


Alkaline Phosphatase    65


 


Total Protein    8.0


 


Albumin    4.3


 


Globulin    3.7


 


Albumin/Globulin Ratio    1.2


 


Blood Type   


 


Antibody Screen   


 


Antibody Identification   


 


ASIF, Poly Interpret   


 


BBK History Checked   














  10/12/18 10/12/18 10/12/18





  04:40 04:40 04:40


 


WBC   


 


RBC   


 


Hgb   


 


Hct   


 


MCV   


 


MCH   


 


MCHC   


 


RDW   


 


Plt Count   


 


Manual Plt Count   


 


Gran %   


 


Lymph % (Auto)   


 


Mono % (Auto)   


 


Eos % (Auto)   


 


Baso % (Auto)   


 


Gran #   


 


Lymph # (Auto)   


 


Mono # (Auto)   


 


Eos # (Auto)   


 


Baso # (Auto)   


 


Differential Comment   


 


Retic Count   5.05 H 


 


PT   


 


INR   


 


APTT   


 


Fibrinogen    269


 


Sodium   


 


Potassium   


 


Chloride   


 


Carbon Dioxide   


 


Anion Gap   


 


BUN   


 


Creatinine   


 


Est GFR ( Amer)   


 


Est GFR (Non-Af Amer)   


 


Random Glucose   


 


Calcium   


 


Total Bilirubin   


 


AST   


 


ALT   


 


Alkaline Phosphatase   


 


Total Protein   


 


Albumin   


 


Globulin   


 


Albumin/Globulin Ratio   


 


Blood Type  A POSITIVE  


 


Antibody Screen  Positive  


 


Antibody Identification  WARM AUTO ANTIBODY  


 


ASIF, Poly Interpret  Positive H  


 


BBK History Checked  Patient has bt  














  10/12/18





  05:00


 


WBC 


 


RBC 


 


Hgb 


 


Hct 


 


MCV 


 


MCH 


 


MCHC 


 


RDW 


 


Plt Count 


 


Manual Plt Count  2 L*


 


Gran % 


 


Lymph % (Auto) 


 


Mono % (Auto) 


 


Eos % (Auto) 


 


Baso % (Auto) 


 


Gran # 


 


Lymph # (Auto) 


 


Mono # (Auto) 


 


Eos # (Auto) 


 


Baso # (Auto) 


 


Differential Comment  See pathology report


 


Retic Count 


 


PT 


 


INR 


 


APTT 


 


Fibrinogen 


 


Sodium 


 


Potassium 


 


Chloride 


 


Carbon Dioxide 


 


Anion Gap 


 


BUN 


 


Creatinine 


 


Est GFR ( Amer) 


 


Est GFR (Non-Af Amer) 


 


Random Glucose 


 


Calcium 


 


Total Bilirubin 


 


AST 


 


ALT 


 


Alkaline Phosphatase 


 


Total Protein 


 


Albumin 


 


Globulin 


 


Albumin/Globulin Ratio 


 


Blood Type 


 


Antibody Screen 


 


Antibody Identification 


 


ASIF, Poly Interpret 


 


BBK History Checked 














Critical Care Progress Note





- Nutrition


Nutrition: 


                                    Nutrition











 Category Date Time Status


 


 Heart Healthy Diet [DIET] Diets  10/12/18 Lunch Active














Addendum


Addendum: 





10/12/18 15:55


ICU Attending Addendum 


Patient seen and examined. Case reviewed on round with housestaff.  Agree with 

resident note above with the following additions/exceptions:





68 M with hx of lymphoma, HTN and Vitamin D deficiency who presented with gum 

bleeding found to be thrombocytopenic.





His plat ct is 1 however is does not appear to be bleeding.  Dental cleaning may

 have caused the initial bleeding however obv precipitated by low plat.  Unclear

 why so thrombocyotpenic


-smear shows now shcstocytes


-no evid of sepsis, dic, or TTP


-await hem/onc eval





Otherwise he is clinically stable 


transfer out of ICU after plat transfusion


repeat CBC post transfusion





Of note, gum swelling is a side effect of CCB and he is on cardizem at home. 

This may be related.





rest of care above  


Gabriela Perez MD 


Intensivist

## 2018-10-12 NOTE — CON
DATE:  10/12/2018



HEMATOLOGY CONSULTATION



HISTORY OF PRESENT ILLNESS:  This is a 68-year-old man known to me about a

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

lymphocytic leukemia, was treated with steroids and Rituxan and did very

well.  I was able to take morphed steroids after Rituxan, and he went back

to work.  I saw him only about a month or so ago, and he was doing fine. 

He says that in respect to that last 3 to 4 days, he started noticing rash

on his legs.  This is a petechia, but otherwise felt fine, went to see his

dentist, who did the procedure and the patient just continued to bleed for

the last 2 days or so.  He came to the emergency room, besides having a

platelet count of 1000 and white count about 2.5 with increasing monocytes,

lymphocytes and the retic count is 6%.



PHYSICAL EXAMINATION

SKIN:  Shows petechiae and submucosal bleeding from his mouth.  Nothing in

his eyes and conjunctivae.

HEENT:  Anicteric.

NODES:  None palpable in the axillary, cervical, supraclavicular or

inguinal regions.

LUNGS:  Clear at present.  No vertebral tenderness.

HEART:  S1 and S2.

ABDOMEN:  Shows no liver, no spleen, no tenderness.  No rebound.

EXTREMITIES:  No edema.

CNS:  No focal finding.



ASSESSMENT:  At this point, he has recurrence of the chronic lymphocytic

leukemia with idiopathic thrombocytopenic purpura Jack syndrome, hemolytic

anemia.  I start him on gamma globulin 30 g daily started today for 5 days,

Solu-Medrol 125 mg every 8 hours for 5 days, Prilosec will be given to him.

I spoke to the doctor, he has a doctor, and we will see how he does in the

next several days.







__________________________________________

Tanner Johnson MD



DD:  10/12/2018 10:48:07

DT:  10/12/2018 13:13:29

Job # [id number]

## 2018-10-12 NOTE — ED PDOC
Arrival/HPI





- General


Chief Complaint: ENT Problem


Time Seen by Provider: 10/12/18 04:16


Historian: Patient





- Critical Care


Critical Care Minutes: 45 minutes





- History of Present Illness


Narrative History of Present Illness (Text): 





10/12/18 04:28


68 year old male, whose past medical history includes hypertension, presents to 

the emergency department with bleeding gums, for 3 days.  Patient states he went

to his dentist 3 days ago for a cleaning,  Patient states they have been 

bleeding ever since.  Patient denies any fevers, chills, headache, or any other 

complaint.


 


10/12/18 06:21





Time/Duration: < week (3 days)


Symptom Course: Unchanged


Context: Home





Past Medical History





- Provider Review


Nursing Documentation Reviewed: Yes





- Infectious Disease


Hx of Infectious Diseases: None





- Cardiac


Hx Hypertension: Yes





- HEENT


Hx HEENT Disorder: Yes (eyeglasses)





- Musculoskeletal/Rheumatological


Hx Falls: No





- Psychiatric


Hx Substance Use: No





- Anesthesia


Hx Anesthesia: No





Family/Social History





- Physician Review


Nursing Documentation Reviewed: Yes


Family/Social History: No Known Family HX


Smoking Status: Never Smoked


Hx Alcohol Use: No


Hx Substance Use: No





Allergies/Home Meds


Allergies/Adverse Reactions: 


Allergies





No Known Allergies Allergy (Verified 10/12/18 04:28)


   








Home Medications: 


                                    Home Meds











 Medication  Instructions  Recorded  Confirmed


 


diltiaZEM CD [Cardizem CD] 300 mg PO DAILY 11/28/17 09/18/18














Review of Systems





- Physician Review


All systems were reviewed & negative as marked: Yes





- Review of Systems


Constitutional: absent: Fevers, Night Sweats


ENT: Other (bleeding from gums)


Neurological: absent: Headache





Physical Exam


Vital Signs Reviewed: Yes





Vital Signs











  Temp Pulse Resp BP Pulse Ox


 


 10/12/18 04:25  98.3 F  78  20  150/107 H  100











Temperature: Afebrile


Blood Pressure: Hypertensive


Pulse: Regular


Respiratory Rate: Normal


Appearance: Positive for: Well-Appearing, Non-Toxic, Comfortable


Pain Distress: None


Mental Status: Positive for: Alert and Oriented X 3





- Systems Exam


Head: Present: Atraumatic, Normocephalic


Pupils: Present: PERRL


Extroacular Muscles: Present: EOMI


Conjunctiva: Present: Normal


Mouth: Present: Moist Mucous Membranes, Other


Neck: Present: Normal Range of Motion


Respiratory/Chest: Present: Clear to Auscultation, Good Air Exchange.  No: 

Respiratory Distress, Accessory Muscle Use


Cardiovascular: Present: Regular Rate and Rhythm, Normal S1, S2.  No: Murmurs


Abdomen: No: Tenderness, Distention, Peritoneal Signs


Back: Present: Normal Inspection


Upper Extremity: Present: Normal Inspection.  No: Cyanosis, Edema


Lower Extremity: Present: Normal Inspection.  No: Edema


Neurological: Present: GCS=15, CN II-XII Intact, Speech Normal


Skin: Present: Rashes (petechial rash to leg)


Psychiatric: Present: Alert, Oriented x 3, Normal Insight, Normal Concentration





Medical Decision Making


ED Course and Treatment: 





10/12/18 04:32


Impression: 68 year old male presents with bleeding gums, also petichial rash on

exam suspect thrombocytopenia





Plan:


-- Labs


-- Avitene


-- Reassess and disposition





Prior Visits:


Notes and results from previous visits were reviewed.





Progress Notes:


10/12/18 05:39


Dr. Johnson has been paged, pending call back.


10/12/18 06:21


methylprednisone ordered , icu requests 2 unit plt pt consented





- Medication Orders


Current Medication Orders: 











Collagen (Avitene)  1 pow MM ONCE ONE


   Stop: 10/12/18 04:28











- Scribe Statement


The provider has reviewed the documentation as recorded by the Nu Rojas





Provider Scribe Attestation:


All medical record entries made by the Luisibe were at my direction and 

personally dictated by me. I have reviewed the chart and agree that the record 

accurately reflects my personal performance of the history, physical exam, 

medical decision making, and the department course for this patient. I have also

personally directed, reviewed, and agree with the discharge instructions and 

disposition.











Disposition/Present on Arrival





- Present on Arrival


Any Indicators Present on Arrival: No


History of DVT/PE: No


History of Uncontrolled Diabetes: No


Urinary Catheter: No


History of Decub. Ulcer: No


History Surgical Site Infection Following: None





- Disposition


Have Diagnosis and Disposition been Completed?: Yes


Diagnosis: 


 Thrombocytopenia





Disposition: HOSPITALIZED


Disposition Time: 06:22


Condition: CRITICAL

## 2018-10-13 LAB
ALBUMIN SERPL-MCNC: 4 G/DL (ref 3–4.8)
ALBUMIN/GLOB SERPL: 1 {RATIO} (ref 1.1–1.8)
ALT SERPL-CCNC: 18 U/L (ref 7–56)
AST SERPL-CCNC: 16 U/L (ref 17–59)
BASOPHILS # BLD AUTO: 0 K/MM3 (ref 0–2)
BASOPHILS NFR BLD: 0 % (ref 0–3)
BUN SERPL-MCNC: 19 MG/DL (ref 7–21)
CALCIUM SERPL-MCNC: 8.9 MG/DL (ref 8.4–10.5)
EOSINOPHIL # BLD: 0 10*3/UL (ref 0–0.7)
EOSINOPHIL NFR BLD: 0 % (ref 1.5–5)
ERYTHROCYTE [DISTWIDTH] IN BLOOD BY AUTOMATED COUNT: 17.9 % (ref 11.5–14.5)
ERYTHROCYTE [DISTWIDTH] IN BLOOD BY AUTOMATED COUNT: 18.1 % (ref 11.5–14.5)
ERYTHROCYTE [DISTWIDTH] IN BLOOD BY AUTOMATED COUNT: 18.3 % (ref 11.5–14.5)
ERYTHROCYTE [DISTWIDTH] IN BLOOD BY AUTOMATED COUNT: 18.3 % (ref 11.5–14.5)
GFR NON-AFRICAN AMERICAN: 55
GRANULOCYTES # BLD: 2.44 10*3/UL (ref 1.4–6.5)
GRANULOCYTES # BLD: 2.53 10*3/UL (ref 1.4–6.5)
GRANULOCYTES # BLD: 2.86 10*3/UL (ref 1.4–6.5)
GRANULOCYTES # BLD: 2.86 10*3/UL (ref 1.4–6.5)
GRANULOCYTES NFR BLD: 84.2 % (ref 50–68)
GRANULOCYTES NFR BLD: 84.3 % (ref 50–68)
GRANULOCYTES NFR BLD: 85.2 % (ref 50–68)
GRANULOCYTES NFR BLD: 85.2 % (ref 50–68)
HGB BLD-MCNC: 8 G/DL (ref 14–18)
HGB BLD-MCNC: 8.2 G/DL (ref 14–18)
HGB BLD-MCNC: 8.2 G/DL (ref 14–18)
HGB BLD-MCNC: 8.3 G/DL (ref 14–18)
LYMPHOCYTES # BLD: 0.3 10*3/UL (ref 1.2–3.4)
LYMPHOCYTES NFR BLD AUTO: 10.7 % (ref 22–35)
LYMPHOCYTES NFR BLD AUTO: 11 % (ref 22–35)
LYMPHOCYTES NFR BLD AUTO: 8.3 % (ref 22–35)
LYMPHOCYTES NFR BLD AUTO: 8.3 % (ref 22–35)
MCH RBC QN AUTO: 29.7 PG (ref 25–35)
MCH RBC QN AUTO: 29.9 PG (ref 25–35)
MCH RBC QN AUTO: 29.9 PG (ref 25–35)
MCH RBC QN AUTO: 30.2 PG (ref 25–35)
MCHC RBC AUTO-ENTMCNC: 31.9 G/DL (ref 31–37)
MCHC RBC AUTO-ENTMCNC: 32.3 G/DL (ref 31–37)
MCV RBC AUTO: 92.7 FL (ref 80–105)
MCV RBC AUTO: 92.7 FL (ref 80–105)
MCV RBC AUTO: 93.2 FL (ref 80–105)
MCV RBC AUTO: 93.6 FL (ref 80–105)
MONOCYTES # BLD AUTO: 0.1 10*3/UL (ref 0.1–0.6)
MONOCYTES # BLD AUTO: 0.2 10*3/UL (ref 0.1–0.6)
MONOCYTES NFR BLD: 4.8 % (ref 1–6)
MONOCYTES NFR BLD: 5 % (ref 1–6)
MONOCYTES NFR BLD: 6.5 % (ref 1–6)
MONOCYTES NFR BLD: 6.5 % (ref 1–6)
PLATELET # BLD EST: 8 10*3/UL
PLATELET # BLD EST: 8 10*3/UL
PLATELET # BLD: 1 10^3/UL (ref 120–450)
PLATELET # BLD: 3 10^3/UL (ref 120–450)
PLATELET # BLD: 6 10^3/UL (ref 120–450)
PLATELET # BLD: 6 10^3/UL (ref 120–450)
RBC # BLD AUTO: 2.65 10^6/UL (ref 3.5–6.1)
RBC # BLD AUTO: 2.74 10^6/UL (ref 3.5–6.1)
RBC # BLD AUTO: 2.74 10^6/UL (ref 3.5–6.1)
RBC # BLD AUTO: 2.79 10^6/UL (ref 3.5–6.1)
WBC # BLD AUTO: 2.9 10^3/UL (ref 4.5–11)
WBC # BLD AUTO: 3 10^3/UL (ref 4.5–11)
WBC # BLD AUTO: 3.4 10^3/UL (ref 4.5–11)
WBC # BLD AUTO: 3.4 10^3/UL (ref 4.5–11)

## 2018-10-13 RX ADMIN — DILTIAZEM HYDROCHLORIDE SCH MG: 300 CAPSULE, EXTENDED RELEASE ORAL at 10:17

## 2018-10-13 RX ADMIN — IMMUNE GLOBULIN SCH MLS/HR: 100 SOLUTION INTRAVENOUS at 10:26

## 2018-10-14 VITALS — RESPIRATION RATE: 20 BRPM

## 2018-10-14 LAB
BASOPHILS # BLD AUTO: 0 K/MM3 (ref 0–2)
BASOPHILS NFR BLD: 0 % (ref 0–3)
EOSINOPHIL # BLD: 0 10*3/UL (ref 0–0.7)
EOSINOPHIL NFR BLD: 0 % (ref 1.5–5)
ERYTHROCYTE [DISTWIDTH] IN BLOOD BY AUTOMATED COUNT: 19 % (ref 11.5–14.5)
GRANULOCYTES # BLD: 2.94 10*3/UL (ref 1.4–6.5)
GRANULOCYTES NFR BLD: 79.1 % (ref 50–68)
HGB BLD-MCNC: 8.1 G/DL (ref 14–18)
LYMPHOCYTES # BLD: 0.4 10*3/UL (ref 1.2–3.4)
LYMPHOCYTES NFR BLD AUTO: 11 % (ref 22–35)
MCH RBC QN AUTO: 30.1 PG (ref 25–35)
MCHC RBC AUTO-ENTMCNC: 31.5 G/DL (ref 31–37)
MCV RBC AUTO: 95.5 FL (ref 80–105)
MONOCYTES # BLD AUTO: 0.4 10*3/UL (ref 0.1–0.6)
MONOCYTES NFR BLD: 9.9 % (ref 1–6)
PLATELET # BLD EST: 37 10*3/UL
PLATELET # BLD: 23 10^3/UL (ref 120–450)
RBC # BLD AUTO: 2.69 10^6/UL (ref 3.5–6.1)
WBC # BLD AUTO: 3.7 10^3/UL (ref 4.5–11)

## 2018-10-14 RX ADMIN — IMMUNE GLOBULIN SCH MLS/HR: 100 SOLUTION INTRAVENOUS at 11:47

## 2018-10-14 RX ADMIN — PANTOPRAZOLE SODIUM SCH MG: 40 TABLET, DELAYED RELEASE ORAL at 06:32

## 2018-10-14 RX ADMIN — DILTIAZEM HYDROCHLORIDE SCH MG: 300 CAPSULE, EXTENDED RELEASE ORAL at 10:17

## 2018-10-14 NOTE — PN
DATE:  10/14/2018



SUBJECTIVE:  The patient is a 68-year-old, seen and examined.  Offers no

complaints.  Eating and tolerating.  No nausea, vomiting.  No hematuria. 

No rectal bleeding.  No gum bleeding.



PHYSICAL EXAMINATION:

VITAL SIGNS:  He is afebrile, pulse 60, respirations 20, blood pressure

163/73.

LUNGS:  Bilateral fair airflow.  No rhonchi or crackle.

HEART:  S1 and S2 audible.

ABDOMEN:  Soft and nontender.  No rebound.  No guarding.

NEUROLOGIC:  The patient is awake, alert, oriented, communicative.



LABORATORY DATA:  WBC 3.7, hemoglobin 8.1, hematocrit 25.7, platelet of 23.

Chemistry:  Sodium 136, potassium 4.4, chloride 104, CO2 of 21, BUN 19,

creatinine 1.3, blood sugar of 213.



ASSESSMENT:

1.  Idiopathic thrombocytopenia.

2.  Chronic lymphocytic leukemia.

3.  Hypertension.



PLAN:  The patient is getting steroid and IV gamma globulin.  He is on

diltiazem.  He is also getting IV steroid.  Start him on losartan.  Since

his blood pressure is running high, we will give him one dose of 50 mg

stat.  Monitor his blood pressure closely.







__________________________________________

Celeste Cueva MD



DD:  10/14/2018 14:44:42

DT:  10/14/2018 19:49:15

Job # [id number]

## 2018-10-14 NOTE — CON
DATE:  10/14/2018



HEMATOLOGY CONSULTATION



HISTORY OF PRESENT ILLNESS:  This is a known chronic lymphocytic leukemia,

who presents last year with hemolytic anemia and ITP secondary to CLL,

received Rituxan and steroids.  He was well until just last few days, but a

week before this admission, started developing petechiae.  In any event, he

is now getting Solu-Medrol three times a day plus the gammaglobulin and he

is feeling somewhat better, although he said that he was feeling badly

before he came to the hospital when he started bleeding from his dental

extraction.  In any event, white count was 1.9 now, 3.4 as of yesterday. 

Hemoglobin has been around the same 8.5 to 8.2 with the platelet count

1000, is now 6000.  Still has a petechiae.  I do not see any new ones on

his legs, now still seems bleeding, but no new areas that I can detect. 

Sugar is about 213 that will go on his steroids.  At this point, CBC is not

ready yet.  We will continue this treatment for 5 days total, so that _____

another three days.  We will see what the CBC shows over the next couple of

days.  So no change in the treatment.







__________________________________________

Tanner Johnson MD



DD:  10/14/2018 8:11:34

DT:  10/14/2018 10:33:51

Job # [id number]

## 2018-10-14 NOTE — PN
DATE:  10/13/2018



SUBJECTIVE:  Patient is 68 years old, seen and examined, lying in bed. 

Seems to be comfortable, offers no complaints.  Eating and tolerating.  No

fever.  No chills.  No nausea, vomiting.  No bleeding from any side

including rectal or hematuria.



PHYSICAL EXAMINATION:

VITAL SIGNS:  He is afebrile, pulse 60, respirations 18, blood pressure

140/82.

LUNGS:  Bilateral fair airflow.  No rhonchi or crackle.

HEART:  S1 and S2 audible.

ABDOMEN:  Soft and nontender.  No rebound.  No guarding.

NEUROLOGIC:  Patient is awake, alert, oriented, communicative.



LABORATORY EXAM:  WBC 33.4, hemoglobin 8.2, hematocrit 25.4, platelet of

_____.  Chemistry:  Sodium 136, potassium 4.4, chloride 104, CO2 21, BUN

19, creatinine 1.3, blood sugar of 213.



ASSESSMENT:

1.  Recurrence of chronic lymphocytic leukemia.

2.  Probably idiopathic thrombocytopenia.

3.  Hypertension.

4.  Leukoderma of both hands.



PLAN:  Patient has been started on high-dose steroid.  We will monitor CBC.

Encourage ambulation.  We will follow up patient in a.m.





__________________________________________

Celeste Cueva MD





DD:  10/13/2018 16:47:00

DT:  10/13/2018 22:51:01

Job # [id number]

## 2018-10-15 RX ADMIN — IMMUNE GLOBULIN SCH MLS/HR: 100 SOLUTION INTRAVENOUS at 10:42

## 2018-10-15 RX ADMIN — PANTOPRAZOLE SODIUM SCH MG: 40 TABLET, DELAYED RELEASE ORAL at 05:07

## 2018-10-15 RX ADMIN — DILTIAZEM HYDROCHLORIDE SCH MG: 300 CAPSULE, EXTENDED RELEASE ORAL at 10:42

## 2018-10-15 NOTE — CP.PCM.PN
<Nicol Patricia - Last Filed: 10/15/18 12:34>





Subjective





- Date & Time of Evaluation


Date of Evaluation: 10/15/18


Time of Evaluation: 07:00





- Subjective


Subjective: 





Medicine Progress Note for PAMELA Donaldson PGY3





Patient seen and examined at bedside. There were no acute overnight events as 

per nursing staff. Patient reports feeling much better today. His bleeding gums 

have resolved.. He denies chest pain, shortness of breath, 

nausea/vomiting/diarrhea, fever or chills, dysuria or hematuria. 





Objective





- Vital Signs/Intake and Output


Vital Signs (last 24 hours): 


                                        











Temp Pulse Resp BP Pulse Ox


 


 97.7 F   55 L  20   128/71   98 


 


 10/15/18 08:48  10/15/18 08:48  10/15/18 08:48  10/15/18 08:48  10/15/18 08:48











- Medications


Medications: 


                               Current Medications





Diltiazem HCl (Cardizem Cd)  300 mg PO DAILY [facility]


   Last Admin: 10/14/18 10:17 Dose:  300 mg


Immune Globulin (Octagam 10%)  300 mls @ 50 mls/hr IV DAILY [facility]


   Stop: 10/16/18 15:59


   Last Admin: 10/14/18 11:47 Dose:  50 mls/hr


Losartan Potassium (Cozaar)  50 mg PO DAILY [facility]


   Last Admin: 10/14/18 17:26 Dose:  50 mg


Methylprednisolone (Solu-Medrol)  125 mg IVP Q8 [facility]


   Last Admin: 10/15/18 05:07 Dose:  125 mg


Pantoprazole Sodium (Protonix Ec Tab)  40 mg PO 0600 [facility]


   Last Admin: 10/15/18 05:07 Dose:  40 mg











- Labs


Labs: 


                                        





                                 10/14/18 07:00 





                                 10/13/18 04:15 





                                        











PT  12.9 SECONDS (9.4-12.5)  H  10/12/18  04:40    


 


INR  1.12   10/12/18  04:40    


 


APTT  30.1 Seconds (25.1-36.5)   10/12/18  04:40    














- Constitutional


Appears: No Acute Distress





- Head Exam


Head Exam: ATRAUMATIC, NORMAL INSPECTION, NORMOCEPHALIC





- Eye Exam


Eye Exam: EOMI, Normal appearance, PERRL


Pupil Exam: NORMAL ACCOMODATION, PERRL





- ENT Exam


ENT Exam: Mucous Membranes Moist, Normal Exam, Normal Oropharynx


Additional comments: 





no bleeding noted 





- Respiratory Exam


Respiratory Exam: Clear to Ausculation Bilateral, NORMAL BREATHING PATTERN.  

absent: Rales, Rhonchi, Wheezes, Respiratory Distress





- Cardiovascular Exam


Cardiovascular Exam: REGULAR RHYTHM, RRR, +S1, +S2.  absent: Gallop, Rubs, 

Murmur





- GI/Abdominal Exam


GI & Abdominal Exam: Soft, Normal Bowel Sounds.  absent: Guarding, Rigid, 

Tenderness, Mass, Organomegaly, Rebound





- Extremities Exam


Extremities Exam: Full ROM, Normal Capillary Refill, Normal Inspection.  absent:

Pedal Edema, Tenderness





- Neurological Exam


Neurological Exam: Awake, Normal Gait, Oriented x3





- Psychiatric Exam


Psychiatric exam: Normal Affect, Normal Mood





- Skin


Skin Exam: Intact, Normal Color, Petechiae (on LE), Warm





Assessment and Plan





- Assessment and Plan (Free Text)


Assessment: 





1. Severe thrombocytopenia -improved 


   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 


2. CLL


   - had tx of steroids and Rituxin a couple months ago 


3. Microangiopathic hemolytic anemia


   - Hgb stable 


4. HTN 


Plan: 





Labs and imaging reviewed. Platelet count increasing. Heme on consult. Patient 

is on high dose steroids and immunoglobulin. He will need a total of 5 day 

course. This is day #3. Continue Cardizem for HTN. Protonix fo GI ppx. Patient 

is tolerating diet and ambulating well. He is not in any pain. He will be d/c 

home on Wednesday and follow up with Dr. Johnson as outpatient. 





Case seen, discussed and reviewed with Dr. Екатерина Patricia PGY3





<Dimitri Willams S - Last Filed: 10/15/18 21:05>





Objective





- Vital Signs/Intake and Output


Vital Signs (last 24 hours): 


                                        











Temp Pulse Resp BP Pulse Ox


 


 98.7 F   55 L  20   131/69   97 


 


 10/15/18 16:41  10/15/18 16:41  10/15/18 16:41  10/15/18 16:41  10/15/18 16:41








Intake and Output: 


                                        











 10/15/18 10/16/18





 18:59 06:59


 


Intake Total 840 


 


Balance 840 














- Medications


Medications: 


                               Current Medications





Diltiazem HCl (Cardizem Cd)  300 mg PO DAILY [facility]


   Last Admin: 10/15/18 10:42 Dose:  300 mg


Immune Globulin (Octagam 10%)  300 mls @ 50 mls/hr IV DAILY [facility]


   Stop: 10/16/18 15:59


   Last Admin: 10/15/18 10:42 Dose:  50 mls/hr


Losartan Potassium (Cozaar)  50 mg PO DAILY [facility]


   Last Admin: 10/15/18 10:42 Dose:  50 mg


Methylprednisolone (Solu-Medrol)  125 mg IVP Q8 [facility]


   Last Admin: 10/15/18 12:59 Dose:  125 mg


Pantoprazole Sodium (Protonix Ec Tab)  40 mg PO 0600 [facility]


   Last Admin: 10/15/18 05:07 Dose:  40 mg











- Labs


Labs: 


                                        





                                 10/14/18 07:00 





                                 10/13/18 04:15 





                                        











PT  12.9 SECONDS (9.4-12.5)  H  10/12/18  04:40    


 


INR  1.12   10/12/18  04:40    


 


APTT  30.1 Seconds (25.1-36.5)   10/12/18  04:40    














Assessment and Plan





- Assessment and Plan (Free Text)


Plan: 





Pt seen and examined. I have reviewed the note of the medical resident and agree

with it. I have discussed the assessment and plan with the resident.  I have 

reviewed the patient's labs and medications. He has CLL and his platelet count 

is improving. He is on Gamma globulin and IV steroids. On Cardizem for HTN. Pt 

with no pain.

## 2018-10-16 VITALS — OXYGEN SATURATION: 95 % | TEMPERATURE: 97.6 F

## 2018-10-16 RX ADMIN — IMMUNE GLOBULIN SCH MLS/HR: 100 SOLUTION INTRAVENOUS at 09:50

## 2018-10-16 RX ADMIN — PANTOPRAZOLE SODIUM SCH MG: 40 TABLET, DELAYED RELEASE ORAL at 06:31

## 2018-10-16 RX ADMIN — DILTIAZEM HYDROCHLORIDE SCH MG: 300 CAPSULE, EXTENDED RELEASE ORAL at 09:19

## 2018-10-16 NOTE — CP.PCM.PN
<Nicol Patricia - Last Filed: 10/16/18 09:08>





Subjective





- Date & Time of Evaluation


Date of Evaluation: 10/16/18


Time of Evaluation: 07:00





- Subjective


Subjective: 





Medicine Progress Note for PAMELA Donaldson PGY3





Patient seen and examined at bedside. I spoke with nursing staff and there were 

no acute overnight events. Patient reports feeling well today. He denies chest 

pain, shortness of breath, nausea/vomiting/diarrhea, fever/chills, 

numbness/tingling, dysuria or hematuria. 





Objective





- Vital Signs/Intake and Output


Vital Signs (last 24 hours): 


                                        











Temp Pulse Resp BP Pulse Ox


 


 98.7 F   55 L  20   131/69   97 


 


 10/15/18 16:41  10/15/18 16:41  10/15/18 16:41  10/15/18 16:41  10/15/18 16:41











- Medications


Medications: 


                               Current Medications





Diltiazem HCl (Cardizem Cd)  300 mg PO DAILY [facility]


   Last Admin: 10/15/18 10:42 Dose:  300 mg


Immune Globulin (Octagam 10%)  300 mls @ 50 mls/hr IV DAILY [facility]


   Stop: 10/16/18 15:59


   Last Admin: 10/15/18 10:42 Dose:  50 mls/hr


Losartan Potassium (Cozaar)  50 mg PO DAILY [facility]


   Last Admin: 10/15/18 10:42 Dose:  50 mg


Methylprednisolone (Solu-Medrol)  125 mg IVP Q8 [facility]


   Last Admin: 10/15/18 21:54 Dose:  125 mg


Pantoprazole Sodium (Protonix Ec Tab)  40 mg PO 0600 [facility]


   Last Admin: 10/16/18 06:31 Dose:  40 mg











- Labs


Labs: 


                                        





                                 10/14/18 07:00 





                                 10/13/18 04:15 





                                        











PT  12.9 SECONDS (9.4-12.5)  H  10/12/18  04:40    


 


INR  1.12   10/12/18  04:40    


 


APTT  30.1 Seconds (25.1-36.5)   10/12/18  04:40    














- Constitutional


Appears: No Acute Distress





- Head Exam


Head Exam: ATRAUMATIC, NORMAL INSPECTION, NORMOCEPHALIC





- Eye Exam


Eye Exam: Normal appearance, PERRL


Pupil Exam: NORMAL ACCOMODATION, PERRL





- ENT Exam


ENT Exam: Mucous Membranes Moist


Additional comments: 





No bleeding gums noted 





- Neck Exam


Neck Exam: Full ROM, Normal Inspection.  absent: Lymphadenopathy, Tenderness





- Respiratory Exam


Respiratory Exam: Clear to Ausculation Bilateral, NORMAL BREATHING PATTERN.  

absent: Rales, Rhonchi, Wheezes, Respiratory Distress





- Cardiovascular Exam


Cardiovascular Exam: REGULAR RHYTHM, +S1, +S2.  absent: Gallop, Rubs, Murmur





- GI/Abdominal Exam


GI & Abdominal Exam: Soft, Normal Bowel Sounds.  absent: Guarding, Rigid, 

Tenderness, Mass, Rebound





- Extremities Exam


Extremities Exam: Full ROM, Normal Capillary Refill, Normal Inspection.  absent:

Calf Tenderness, Pedal Edema, Tenderness





- Neurological Exam


Neurological Exam: Alert, Awake, CN II-XII Intact, Normal Gait, Oriented x3.  

absent: Motor Sensory Deficit





- Psychiatric Exam


Psychiatric exam: Normal Affect, Normal Mood





- Skin


Skin Exam: Dry, Intact, Normal Color, Petechiae (on bilateral LE, improving ), 

Warm





Assessment and Plan





- Assessment and Plan (Free Text)


Assessment: 





1. Severe Idiopathic thrombocytopenia - improving 


   - Secondary to CLL 


2. CLL


   - had tx of steroids and Rituxin a couple months ago 


3. Microangiopathic hemolytic anemia


   - Hgb stable 


4. HTN 


Plan: 


Labs and imaging reviewed. Patient doing well. He is on Solumedrol and Gamma angel

bulin day # 4 out of 5. Will complete 5 day course. Continue Cardizem for HTN. 

Patient is on Protonix for GI prophylaxis while on high dose steroids. Pain is 

controlled and and patient is tolerating diet. Plan for d/c home tomorrow after 

completing 5th day of treatment. 





Case seen, discussed and reviewed with Dr. Екатерина Patricia PGY3





<Dimitri Willams S - Last Filed: 10/16/18 19:27>





Objective





- Vital Signs/Intake and Output


Vital Signs (last 24 hours): 


                                        











Temp Pulse Resp BP Pulse Ox


 


 97.8 F   60   20   140/81   98 


 


 10/16/18 08:10  10/16/18 09:20  10/16/18 08:10  10/16/18 09:20  10/16/18 08:10








Intake and Output: 


                                        











 10/16/18 10/17/18





 18:59 06:59


 


Intake Total 1700 


 


Balance 1700 














- Medications


Medications: 


                               Current Medications





Diltiazem HCl (Cardizem Cd)  300 mg PO DAILY [facility]


   Last Admin: 10/16/18 09:19 Dose:  300 mg


Losartan Potassium (Cozaar)  50 mg PO DAILY [facility]


   Last Admin: 10/16/18 09:20 Dose:  50 mg


Methylprednisolone (Solu-Medrol)  125 mg IVP Q8 [facility]


   Last Admin: 10/16/18 14:20 Dose:  125 mg


Pantoprazole Sodium (Protonix Ec Tab)  40 mg PO 0600 [facility]


   Last Admin: 10/16/18 06:31 Dose:  40 mg











- Labs


Labs: 


                                        





                                 10/14/18 07:00 





                                 10/13/18 04:15 





                                        











PT  12.9 SECONDS (9.4-12.5)  H  10/12/18  04:40    


 


INR  1.12   10/12/18  04:40    


 


APTT  30.1 Seconds (25.1-36.5)   10/12/18  04:40    














Assessment and Plan





- Assessment and Plan (Free Text)


Plan: 





Pt seen and examined. I have reviewed the note of the medical resident and agree

with it. I have discussed the assessment and plan with the resident.  I have 

reviewed the patient's labs and medications. Pt on Gamma globulin and Solumedrol

for the CLL and thrombocytopenia. Pt on Protonix for GI prophylaxis as pt is on 

steroids. Pt will f/u with Dr Johnson. Continue with Cardizem for HTN.

## 2018-10-17 VITALS — DIASTOLIC BLOOD PRESSURE: 75 MMHG | HEART RATE: 62 BPM | SYSTOLIC BLOOD PRESSURE: 146 MMHG

## 2018-10-17 LAB
ALBUMIN SERPL-MCNC: 3.2 G/DL (ref 3–4.8)
ALBUMIN/GLOB SERPL: 0.7 {RATIO} (ref 1.1–1.8)
ALT SERPL-CCNC: 24 U/L (ref 7–56)
AST SERPL-CCNC: 24 U/L (ref 17–59)
BASOPHILS # BLD AUTO: 0 K/MM3 (ref 0–2)
BASOPHILS NFR BLD: 0 % (ref 0–3)
BUN SERPL-MCNC: 23 MG/DL (ref 7–21)
CALCIUM SERPL-MCNC: 8 MG/DL (ref 8.4–10.5)
EOSINOPHIL # BLD: 0 10*3/UL (ref 0–0.7)
EOSINOPHIL NFR BLD: 0 % (ref 1.5–5)
ERYTHROCYTE [DISTWIDTH] IN BLOOD BY AUTOMATED COUNT: 17.5 % (ref 11.5–14.5)
GFR NON-AFRICAN AMERICAN: > 60
GRANULOCYTES # BLD: 1.94 10*3/UL (ref 1.4–6.5)
GRANULOCYTES NFR BLD: 75.5 % (ref 50–68)
HGB BLD-MCNC: 8.8 G/DL (ref 14–18)
LYMPHOCYTES # BLD: 0.5 10*3/UL (ref 1.2–3.4)
LYMPHOCYTES NFR BLD AUTO: 18.7 % (ref 22–35)
MCH RBC QN AUTO: 30.1 PG (ref 25–35)
MCHC RBC AUTO-ENTMCNC: 31.7 G/DL (ref 31–37)
MCV RBC AUTO: 95.2 FL (ref 80–105)
MONOCYTES # BLD AUTO: 0.2 10*3/UL (ref 0.1–0.6)
MONOCYTES NFR BLD: 5.8 % (ref 1–6)
PLATELET # BLD: 133 10^3/UL (ref 120–450)
PLATELET COUNT MANUAL: 138 K/MM3 (ref 120–450)
PMV BLD AUTO: 12.3 FL (ref 7–11)
RBC # BLD AUTO: 2.92 10^6/UL (ref 3.5–6.1)
WBC # BLD AUTO: 2.6 10^3/UL (ref 4.5–11)

## 2018-10-17 RX ADMIN — DILTIAZEM HYDROCHLORIDE SCH MG: 300 CAPSULE, EXTENDED RELEASE ORAL at 09:37

## 2018-10-17 RX ADMIN — PANTOPRAZOLE SODIUM SCH MG: 40 TABLET, DELAYED RELEASE ORAL at 05:39

## 2018-10-17 NOTE — CON
DATE:  10/16/2018



HEMATOLOGY EVALUATION



HISTORY OF PRESENT ILLNESS:  This is a 68-year-old man with CLL and

hemolytic anemia and thrombocytopenia.  He is doing much better.  He is

getting fifth day of IgG gammaglobulin infusion plus the Solu-Medrol 125 mg

t.i.d.  He is feeling much better.  The petechiae had gone away.  The mouth

is much better.  There is no more blood blisters.  The platelet count today

is 23,000.  We will get another count on him tomorrow.  He knows to come to

my office on Monday.  I will do CBC on him then, and then we will see if we

can start him on Rituxan or bendamustine or one of the other medications,

but he is aware that.  So I will see him.  He is going to go home on 20 mg

of prednisone b.i.d. and I will repeat the CBC on him on Monday.







__________________________________________

Tanner Johnson MD





DD:  10/16/2018 12:41:14

DT:  10/16/2018 14:36:27

Job # [id number]

## 2018-10-17 NOTE — CP.PCM.DIS
<Nicol Patricia - Last Filed: 10/17/18 07:45>





Provider





- Provider


Date of Admission: 


10/12/18 05:26





Attending physician: 


Dimitri Willams MD





Primary care physician: 


Silas Medina MD





Consults: 


Heme: Dr. Johnson 


Time Spent in preparation of Discharge (in minutes): 35





Hospital Course





- Lab Results


Lab Results: 


                                  Micro Results





10/12/18 07:45   Naris   MRSA Culture (Admit) - Final


                            MRSA NOT DETECTED





                             Most Recent Lab Values











WBC  3.7 10^3/ul (4.5-11.0)  L  10/14/18  07:00    


 


RBC  2.69 10^6/uL (3.5-6.1)  L  10/14/18  07:00    


 


Hgb  8.1 g/dL (14.0-18.0)  L  10/14/18  07:00    


 


Hct  25.7 % (42.0-52.0)  L  10/14/18  07:00    


 


MCV  95.5 fl (80.0-105.0)   10/14/18  07:00    


 


MCH  30.1 pg (25.0-35.0)   10/14/18  07:00    


 


MCHC  31.5 g/dl (31.0-37.0)   10/14/18  07:00    


 


RDW  19.0 % (11.5-14.5)  H  10/14/18  07:00    


 


Plt Count  23 10^3/uL (120.0-450.0)  L*  10/14/18  07:00    


 


Manual Plt Count  2 K/mm3 (120-450)  L*  10/12/18  05:00    


 


Gran %  79.1 % (50.0-68.0)  H  10/14/18  07:00    


 


Lymph % (Auto)  11.0 % (22.0-35.0)  L  10/14/18  07:00    


 


Mono % (Auto)  9.9 % (1.0-6.0)  H  10/14/18  07:00    


 


Eos % (Auto)  0.0 % (1.5-5.0)  L  10/14/18  07:00    


 


Baso % (Auto)  0.0 % (0.0-3.0)   10/14/18  07:00    


 


Gran #  2.94  (1.4-6.5)   10/14/18  07:00    


 


Lymph # (Auto)  0.4  (1.2-3.4)  L  10/14/18  07:00    


 


Mono # (Auto)  0.4  (0.1-0.6)   10/14/18  07:00    


 


Eos # (Auto)  0.0  (0.0-0.7)   10/14/18  07:00    


 


Baso # (Auto)  0.00 K/mm3 (0.0-2.0)   10/14/18  07:00    


 


Differential Comment  See pathology report   10/12/18  05:00    


 


Platelet Evaluation  37  (NORMAL)   10/14/18  07:00    


 


Retic Count  5.05 % (0.5-1.5)  H  10/12/18  04:40    


 


Haptoglobin  117.7 mg/dL (30.0-200.0)   10/12/18  08:15    


 


PT  12.9 SECONDS (9.4-12.5)  H  10/12/18  04:40    


 


INR  1.12   10/12/18  04:40    


 


APTT  30.1 Seconds (25.1-36.5)   10/12/18  04:40    


 


Fibrinogen  269 mg/dl (200-400)   10/12/18  04:40    


 


Sodium  136 mmol/L (132-148)   10/13/18  04:15    


 


Potassium  4.4 mmol/L (3.6-5.0)   10/13/18  04:15    


 


Chloride  104 mmol/L ()   10/13/18  04:15    


 


Carbon Dioxide  21 mmol/L (21-33)   10/13/18  04:15    


 


Anion Gap  15  (10-20)   10/13/18  04:15    


 


BUN  19 mg/dL (7-21)   10/13/18  04:15    


 


Creatinine  1.3 mg/dl (0.8-1.5)   10/13/18  04:15    


 


Est GFR ( Amer)  > 60   10/13/18  04:15    


 


Est GFR (Non-Af Amer)  55   10/13/18  04:15    


 


Random Glucose  213 mg/dL ()  H  10/13/18  04:15    


 


Calcium  8.9 mg/dL (8.4-10.5)   10/13/18  04:15    


 


Phosphorus  3.5 mg/dL (2.5-4.5)   10/13/18  04:15    


 


Magnesium  2.2 mg/dL (1.7-2.2)   10/13/18  04:15    


 


Total Bilirubin  0.5 mg/dL (0.2-1.3)   10/13/18  04:15    


 


AST  16 U/L (17-59)  L  10/13/18  04:15    


 


ALT  18 U/L (7-56)   10/13/18  04:15    


 


Alkaline Phosphatase  56 U/L ()   10/13/18  04:15    


 


Total Protein  8.0 g/dL (5.8-8.3)   10/13/18  04:15    


 


Albumin  4.0 g/dL (3.0-4.8)   10/13/18  04:15    


 


Globulin  4.0 gm/dL  10/13/18  04:15    


 


Albumin/Globulin Ratio  1.0  (1.1-1.8)  L  10/13/18  04:15    


 


Blood Type  A POSITIVE   10/12/18  04:40    


 


Antibody Screen  Positive   10/12/18  04:40    


 


Antibody Identification  WARM AUTO ANTIBODY   10/12/18  04:40    


 


Antigen Identification  A1 Antigen - POSITIVE   10/12/18  04:40    


 


ASIF, Poly Interpret  Positive  (NEGATIVE)  H  10/12/18  04:40    


 


BBK History Checked  Patient has bt   10/12/18  04:40    














- Hospital Course


Hospital Course: 


This is a 67yo male with past medical history of HTN, CLL, microangiopathic 

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 Heme was 

consulted. Labs and imaging reviewed. Patient was placed on high dose solumedrol

and gamma globulin. Patient completed 5 days of treatment. Platelets improved 

greatly. Hgb remained stable. Patient's home med Cardizem was continued for HTN.

Cozaar was added for another BP medication. He remained stable throughout his 

stay. Patient will be d/c home with Prednisone taper and Protonix while on the 

steroids. He was given a prescription for Cozaar. He will follow up with Dr. Medina this week. Patient will follow up with Dr. Johnson on Monday for steroid 

taper and chemotherapy. Patient was educated on diagnosis of CLL and encouraged 

to read about his diagnosis. CLL and ITP info given in d/c information. Patient 

verbalized and agreed with discharge plan. 








- Date & Time of H&P


Date of H&P: 10/12/18


Time of H&P: 06:00





Discharge Exam





- Head Exam


Head Exam: ATRAUMATIC, NORMAL INSPECTION, NORMOCEPHALIC





- Eye Exam


Eye Exam: EOMI, Normal appearance, PERRL


Pupil Exam: NORMAL ACCOMODATION, PERRL





- ENT Exam


ENT Exam: Mucous Membranes Moist





- Neck Exam


Neck exam: Full Rom, Normal Inspection





- Respiratory Exam


Respiratory Exam: Clear to PA & Lateral, NORMAL BREATHING PATTERN, UNREMARKABLE.

 absent: Rales, Rhonchi, Wheezes, Respiratory Distress





- Cardiovascular Exam


Cardiovascular Exam: REGULAR RHYTHM, RRR, +S1, +S2.  absent: Diastolic murmur, 

Gallop, Rubs, Systolic Murmur





- GI/Abdominal Exam


GI & Abdominal Exam: Normal Bowel Sounds, Soft, Unremarkable.  absent: Mass, 

Rebound, Rigid, Tenderness





- Extremities Exam


Extremities exam: normal capillary refill, normal inspection, pedal pulses 

present





- Neurological Exam


Neurological exam: Alert, CN II-XII Intact, Normal Gait, Oriented x3





- Psychiatric Exam


Psychiatric exam: Normal Affect, Normal Mood





- Skin


Skin Exam: Dry, Intact, Normal Color, Warm


Additional comments: 





No petechiae seen 





Discharge Plan





- Discharge Medications


Prescriptions: 


RX: Losartan [Cozaar] 50 mg PO DAILY #30 tab


RX: Prednisone [Deltasone] 20 mg PO BID #30 tablet


Pantoprazole Sodium [Protonix] 20 mg PO DAILY #14 ect





- Follow Up Plan


Condition: CRITICAL


Disposition: HOME/ ROUTINE


Instructions:  Chronic Lymphocytic Leukemia (CLL), Immune Thrombocytopenia (ITP)

 (DC)


Additional Instructions: 


1. Continue Prednisone 20mg twice per day. Take Protonix (stomach acid reducer) 

while taking Prednisone. 





2. Continue new blood pressure medication: Cozaar 50mg daily 





3. Re-check blood pressure with primary care doctor 





4. Follow up with Dr. Johnson on Monday  


Referrals: 


Silas Medina MD [Primary Care Provider] - 





<Dimitri Willams - Last Filed: 10/17/18 21:25>





Provider





- Provider


Date of Admission: 


10/12/18 05:26





Attending physician: 


Dimitri Willams MD





Primary care physician: 


Silas Medina MD








Hospital Course





- Lab Results


Lab Results: 


                                  Micro Results





10/12/18 07:45   Naris   MRSA Culture (Admit) - Final


                            MRSA NOT DETECTED





                             Most Recent Lab Values











WBC  2.6 10^3/ul (4.5-11.0)  L* D 10/17/18  06:30    


 


RBC  2.92 10^6/uL (3.5-6.1)  L  10/17/18  06:30    


 


Hgb  8.8 g/dL (14.0-18.0)  L  10/17/18  06:30    


 


Hct  27.8 % (42.0-52.0)  L  10/17/18  06:30    


 


MCV  95.2 fl (80.0-105.0)   10/17/18  06:30    


 


MCH  30.1 pg (25.0-35.0)   10/17/18  06:30    


 


MCHC  31.7 g/dl (31.0-37.0)   10/17/18  06:30    


 


RDW  17.5 % (11.5-14.5)  H  10/17/18  06:30    


 


Plt Count  133 10^3/uL (120.0-450.0)   10/17/18  06:30    


 


Manual Plt Count  138 K/mm3 (120-450)   10/17/18  06:30    


 


MPV  12.3 fl (7.0-11.0)  H  10/17/18  06:30    


 


Gran %  75.5 % (50.0-68.0)  H  10/17/18  06:30    


 


Lymph % (Auto)  18.7 % (22.0-35.0)  L  10/17/18  06:30    


 


Mono % (Auto)  5.8 % (1.0-6.0)   10/17/18  06:30    


 


Eos % (Auto)  0.0 % (1.5-5.0)  L  10/17/18  06:30    


 


Baso % (Auto)  0.0 % (0.0-3.0)   10/17/18  06:30    


 


Gran #  1.94  (1.4-6.5)   10/17/18  06:30    


 


Lymph # (Auto)  0.5  (1.2-3.4)  L  10/17/18  06:30    


 


Mono # (Auto)  0.2  (0.1-0.6)   10/17/18  06:30    


 


Eos # (Auto)  0.0  (0.0-0.7)   10/17/18  06:30    


 


Baso # (Auto)  0.00 K/mm3 (0.0-2.0)   10/17/18  06:30    


 


Differential Comment  See pathology report   10/12/18  05:00    


 


Platelet Evaluation  37  (NORMAL)   10/14/18  07:00    


 


Retic Count  5.05 % (0.5-1.5)  H  10/12/18  04:40    


 


Haptoglobin  117.7 mg/dL (30.0-200.0)   10/12/18  08:15    


 


PT  12.9 SECONDS (9.4-12.5)  H  10/12/18  04:40    


 


INR  1.12   10/12/18  04:40    


 


APTT  30.1 Seconds (25.1-36.5)   10/12/18  04:40    


 


Fibrinogen  269 mg/dl (200-400)   10/12/18  04:40    


 


Sodium  134 mmol/L (132-148)   10/17/18  06:30    


 


Potassium  4.2 mmol/L (3.6-5.0)   10/17/18  06:30    


 


Chloride  104 mmol/L ()   10/17/18  06:30    


 


Carbon Dioxide  22 mmol/L (21-33)   10/17/18  06:30    


 


Anion Gap  12  (10-20)   10/17/18  06:30    


 


BUN  23 mg/dL (7-21)  H  10/17/18  06:30    


 


Creatinine  0.9 mg/dl (0.8-1.5)   10/17/18  06:30    


 


Est GFR ( Amer)  > 60   10/17/18  06:30    


 


Est GFR (Non-Af Amer)  > 60   10/17/18  06:30    


 


Random Glucose  267 mg/dL ()  H  10/17/18  06:30    


 


Calcium  8.0 mg/dL (8.4-10.5)  L  10/17/18  06:30    


 


Phosphorus  3.5 mg/dL (2.5-4.5)   10/13/18  04:15    


 


Magnesium  2.2 mg/dL (1.7-2.2)   10/13/18  04:15    


 


Total Bilirubin  0.3 mg/dL (0.2-1.3)   10/17/18  06:30    


 


AST  24 U/L (17-59)   10/17/18  06:30    


 


ALT  24 U/L (7-56)   10/17/18  06:30    


 


Alkaline Phosphatase  51 U/L ()   10/17/18  06:30    


 


Total Protein  7.7 g/dL (5.8-8.3)   10/17/18  06:30    


 


Albumin  3.2 g/dL (3.0-4.8)   10/17/18  06:30    


 


Globulin  4.5 gm/dL  10/17/18  06:30    


 


Albumin/Globulin Ratio  0.7  (1.1-1.8)  L  10/17/18  06:30    


 


Blood Type  A POSITIVE   10/12/18  04:40    


 


Antibody Screen  Positive   10/12/18  04:40    


 


Antibody Identification  WARM AUTO ANTIBODY   10/12/18  04:40    


 


Antigen Identification  A1 Antigen - POSITIVE   10/12/18  04:40    


 


ASIF, Poly Interpret  Positive  (NEGATIVE)  H  10/12/18  04:40    


 


BBK History Checked  Patient has bt   10/12/18  04:40    














- Hospital Course


Hospital Course: 





Pt seen and examined. I have reviewed the note of the medical resident and agree

with it. I have discussed the assessment and plan with the resident.  I have 

reviewed the patient's labs and medications. Pt with CLL and low platelets. The 

CBC showed that the platelets has improved. He was given steroids and has 

improved. He will f/u with Dr Johnson. He will be given protonix. Pt will continue

with Cozaar. He will f/u with Dr Medina. Outgoing [facility] sent via fax to provider per Pt request, confirmation @ 9:42am.